# Patient Record
Sex: FEMALE | URBAN - METROPOLITAN AREA
[De-identification: names, ages, dates, MRNs, and addresses within clinical notes are randomized per-mention and may not be internally consistent; named-entity substitution may affect disease eponyms.]

---

## 2019-10-23 ENCOUNTER — APPOINTMENT (RX ONLY)
Dept: URBAN - METROPOLITAN AREA CLINIC 23 | Facility: CLINIC | Age: 39
Setting detail: DERMATOLOGY
End: 2019-10-23

## 2019-10-23 DIAGNOSIS — Z41.9 ENCOUNTER FOR PROCEDURE FOR PURPOSES OTHER THAN REMEDYING HEALTH STATE, UNSPECIFIED: ICD-10-CM

## 2019-10-23 PROCEDURE — ? FILLERS

## 2019-10-23 PROCEDURE — ? PULSED-DYE LASER

## 2019-10-23 PROCEDURE — ? RECOMMENDATIONS

## 2019-10-23 NOTE — PROCEDURE: FILLERS
Nasolabial Folds Filler Volume In Cc: 0
Lot #: [id number]
Additional Area 3 Location: marionette lines, oral commiseurs, NFs. lateral cheeks
Include Cannula Size?: 25G
Additional Anesthesia Volume In Cc: 6
Additional Area 2 Location: tear troughs (cannula used).  oral commissures , NFS
Additional Area 1 Location: fine lines , perioral, oral commissures
Include Cannula Length?: 1.5 inch
Additional Area 4 Location: vermillion lips, tear trough (cannula used)
Expiration Date (Month Year): 05/19/2020
Additional Area 1 Location: chin
Include Cannula Information In Note?: No
Additional Area 5 Location: upper lip
Additional Area 3 Location: lateral cheeks, mid cheek
Additional Area 4 Location: vermillion lips, perioral lines
Filler: Restylane
Detail Level: Zone
Lot #: [id number]
Expiration Date (Month Year): 10/31/21
Lot #: [id number]
Include Cannula Information In Note?: Yes
Price (Use Numbers Only, No Special Characters Or $): 0.00
Include Cannula Brand?: DermaSculpt
Expiration Date (Month Year): 12/31/21
Anesthesia Type: 1% lidocaine with epinephrine
Lot #: [id number]
Anesthesia Volume In Cc: 0.3
Additional Area 1 Location: right cheek
Expiration Date (Month Year): 08/31/2020
Map Statment: See [address] for Complete Details
Additional Area 1 Location: vermillion lips, left cheek smile line, left medial cheek
Post-Care Instructions: Patient instructed to apply ice to reduce swelling.
Additional Area 2 Location: Remaining syringe will be injected in 7 days post hydase injections to tear through areas.
Additional Area 1 Volume In Cc: 0.5
Consent: Written consent obtained. Risks include but not limited to bruising, beading, irregular texture, ulceration, infection, allergic reaction, scar formation, incomplete augmentation, temporary nature, procedural pain.

## 2019-10-23 NOTE — HPI: NODULE (SMALL BUMP UNDERNEATH SKIN)
How Severe Is Your Nodule?: mild
Is This A New Presentation, Or A Follow-Up?: Nodule
Additional History: Patient had filler done (odilia) in a different office. Patient states she thinks she has a bump.

## 2019-10-23 NOTE — PROCEDURE: RECOMMENDATIONS
Detail Level: Zone
Recommendations (Free Text): Bharati Amaya
Recommendation Preamble: The following recommendations were made during the visit:

## 2019-10-23 NOTE — PROCEDURE: PULSED-DYE LASER
Delay Time (Ms): [address] [facility]
Cryogen Time (Ms): [address] [address]
Spot Size: 7 mm
Post-Care Instructions: I reviewed with the patient in detail post-care instructions. Patient should stay away from the sun and wear sun protection until treated areas are fully healed.
Fluence In J/Cm2 (Optional): 6.50
Pulse Duration: 6 ms
Cryogen Time (Ms): [address]
Detail Level: Zone
Spot Size: 10 mm
Fluence In J/Cm2 (Optional): 5:50
Location Override: post cosmetic injections
Pulse Duration: 10 ms
Delay Time (Ms): 20
Cryogen Time (Ms): 27
Treated Area: small area
Consent: Written consent obtained, risks reviewed including but not limited to crusting, scabbing, blistering, scarring, darker or lighter pigmentary change, incidental hair removal, bruising, and/or incomplete removal.
Laser Type: Vbeam 595nm
Delay Time (Ms): 10
Delay Time (Ms): 0
Cryogen Time (Ms): 30

## 2020-01-21 ENCOUNTER — APPOINTMENT (RX ONLY)
Dept: URBAN - METROPOLITAN AREA CLINIC 23 | Facility: CLINIC | Age: 40
Setting detail: DERMATOLOGY
End: 2020-01-21

## 2020-01-21 DIAGNOSIS — Z41.9 ENCOUNTER FOR PROCEDURE FOR PURPOSES OTHER THAN REMEDYING HEALTH STATE, UNSPECIFIED: ICD-10-CM

## 2020-01-21 PROCEDURE — ? RECOMMENDATIONS

## 2020-01-21 PROCEDURE — ? SCLEROTHERAPY

## 2020-01-21 PROCEDURE — 36471 NJX SCLRSNT MLT INCMPTNT VN: CPT | Mod: 50

## 2020-01-21 ASSESSMENT — LOCATION SIMPLE DESCRIPTION DERM
LOCATION SIMPLE: RIGHT PRETIBIAL REGION
LOCATION SIMPLE: LEFT PRETIBIAL REGION

## 2020-01-21 ASSESSMENT — LOCATION DETAILED DESCRIPTION DERM
LOCATION DETAILED: RIGHT PROXIMAL PRETIBIAL REGION
LOCATION DETAILED: LEFT PROXIMAL PRETIBIAL REGION

## 2020-01-21 ASSESSMENT — LOCATION ZONE DERM: LOCATION ZONE: LEG

## 2020-01-21 NOTE — PROCEDURE: SCLEROTHERAPY
Sclerosant Volume (Cc): 10
Consent was obtained with risks, benefits, and alternatives discussed for the above procedures. Photographs were taken.
Number Of Injections (Will Not Render If 0): 0
Sclerosant Volume (Cc): 2.5
Render Post Care In Note: No
Detail Level: Zone
Disposition: Compression stockings and short stretch elastic bandages were placed postoperatively.
Post-Care Instructions: Compression for 3 weeks is critical to optimize your recovery and results. Compliance with compression helps to prevent blood clots and minimizes pain, swelling, bruising, skin discoloration (staining) and the recurrence of vessels. \nMaterials to gather for your wound care (all available over the counter)      \nCompression stockings x 2 pairs, 4 x 4 gauze, Comprilan wrap: 8 cm and 10 cm width wrap, Medical adhesive tape       \nCompression and Wound care;  Leg compression for 3 weeks is bird to your success and safety. Compression at night is only needed the first day. After that, compression is needed only during waking hours. However, if your leg feels better with compression at night, then you may continue compression at night as tolerated. \nAfter the sclerotherapy procedure, 2 layers compression will be placed. \n1. On the skin, folded or flat gauze will cover the treated areas. \n2. A compression wrap (Comprilan) will be wrapped around your leg over the gauze. Once the compression wrap is in place, a compression stocking will be worn. This two layer  compression (wrap plus stocking) should be worn for the first 24 hours if tolerated. \n3. After 24 hours, remove all compressions and dressings and just wear the compression stockings only during waking hours. \nYou will need to wear compression stockings for three weeks after your procedure, unless your physician instructs you otherwise. \nActivity       \n - It is important NOT to be sitting or lying down for several hours after surgery. You may begin walking immediately after surgery. This is good for you, but take it easy. \n - For 2 days after treatment: Avoid aerobic exercise, weight training, and all other types of exertion that increase your breathing and pulse rates. \n - Do not get a tan for one month after sclerotherapy. Tanning increases your risk of skin discoloration. \nBathing       \nAfter 24 hours, you may shower or bathe in tepid water, but keep the compression stocking on. Avoid immersion in hot tubs. \nDiscomfort . For pain or discomfort:       \n - You may take acetaminophen (TylenolTM, Extra-Strength TylenolTM or DatrilTM) as directed. \n - Do not use aspirin, products containing aspirin, or ibuprofen (for example AdvilTM or MotrinTM) for five days after your surgery, unless approved by your physician. \n - Dietary restrictions Do not drink alcoholic beverages for two days after your sclerotherapy procedure. \nPossible side effects following sclerotherapy  After sclerotherapy, mild swelling is expected. The injection sites may also become bruised or gray. You may also experience one or more of the following side effects, which almost always go away within one to four months:       \n - Darkening of the injected veins       \n - Brownish staining of the skin       \n - Small clotted vessels under the surface of the skin that you can feel      \n - Bruising of the injection sites       \nWhat to do about bruising  This will resolve within 2-3 weeks. If you wish the bruising to disappear sooner, then applying Arnicare cream (over the counter, health food stores) will help. \nWhat to do if you feel small clotted vessels under the surface of the skin. \n - Call us for a follow up appointment. These small clots can be drained through a small nick. \n - Draining these small clots will help you heal faster and with less discoloration.       \nWhen to contact your physician       \nContact your physician if you experience any of the following:       \n - Treated areas become increasingly sore, tender, red or warm       \n - Acetaminophen does not relieve your discomfort       \n - Injection sites turn black or the skin around the site breaks down       \n - Ulceration of the injection sites       \n - You develop blisters from the tape       \n - You develop significant swelling or pain in the leg       \n - Darkening of large areas of the skin or foot
Lot # A: [id number]
Expiration Date A (Month Year): 5/21

## 2020-01-21 NOTE — PROCEDURE: RECOMMENDATIONS
Recommendation Preamble: The following recommendations were made during the visit:
Recommendations (Free Text): Picoway laser for skin texture and tone\\nDiscussed thread lift vs fillers for the cheek area, Voluma\\nRecommended thread lift x 6 Melba 4
Detail Level: Zone

## 2020-02-04 ENCOUNTER — APPOINTMENT (RX ONLY)
Dept: URBAN - METROPOLITAN AREA CLINIC 23 | Facility: CLINIC | Age: 40
Setting detail: DERMATOLOGY
End: 2020-02-04

## 2020-02-04 DIAGNOSIS — Z41.9 ENCOUNTER FOR PROCEDURE FOR PURPOSES OTHER THAN REMEDYING HEALTH STATE, UNSPECIFIED: ICD-10-CM

## 2020-02-04 PROCEDURE — ? DIAGNOSIS COMMENT

## 2020-02-04 PROCEDURE — ? ADDITIONAL NOTES

## 2020-02-04 PROCEDURE — ? RECOMMENDATIONS

## 2020-02-04 PROCEDURE — ? CHEMICAL PEEL

## 2020-02-04 NOTE — HPI: COSMETIC FOLLOW UP
How Did You Tolerate The Procedure?: well, without problems
What Condition Are We Treating?: Patient here s/p 2 weeks post sclerotherapy lower legs. C/o few bruising legs and remaining spider veins.
What Procedure Did We Perform At The Last Visit?: Sclerotherapy bilateral lower legs.
- - -

## 2020-02-04 NOTE — PROCEDURE: ADDITIONAL NOTES
Additional Notes: Good result. Some few bruises observed at the sites of injections. Patient reassured to wait few more weeks and bruises will resolve. Second session of sclerotherapy recommended to treat remaining spider veins.
Detail Level: Detailed

## 2020-02-04 NOTE — PROCEDURE: CHEMICAL PEEL
Post-Care Instructions: I reviewed with the patient in detail post-care instructions. Patient should avoid sun exposure and wear sun protection.
Comments: Tretinoin 0.03% solution (2 coats) applied.
Number Of Layers: 1
Treatment Time (Optional): 2 minutes
Prep: The treated area was degreased with pre-peel cleanser, and vaseline was applied for protection of mucous membranes.
Price (Use Numbers Only, No Special Characters Or $): Obdulia 610
Lot Number (Optional): [id number]
Expiration Date (Optional): 02/2021
Post Peel Care: After the procedure, a post-peel cream was applied to the treated areas. Post-care instructions were reviewed with the patient.
Consent: Prior to the procedure, written consent was obtained and risks were reviewed, including but not limited to: redness, peeling, blistering, pigmentary change, scarring, infection, and pain.
Detail Level: Detailed
Chemical Peel: Skin Medica Vitalize

## 2020-02-18 ENCOUNTER — APPOINTMENT (RX ONLY)
Dept: URBAN - METROPOLITAN AREA CLINIC 23 | Facility: CLINIC | Age: 40
Setting detail: DERMATOLOGY
End: 2020-02-18

## 2020-02-18 DIAGNOSIS — Z41.9 ENCOUNTER FOR PROCEDURE FOR PURPOSES OTHER THAN REMEDYING HEALTH STATE, UNSPECIFIED: ICD-10-CM

## 2020-02-18 PROCEDURE — ? FILLERS

## 2020-02-18 NOTE — PROCEDURE: FILLERS
Expiration Date (Month Year): 03/24/21
Additional Area 1 Location: lateral cheeks, NLFâs
Include Cannula Size?: 25G
Consent: Written consent obtained. Risks include but not limited to bruising, beading, irregular texture, ulceration, infection, allergic reaction, scar formation, incomplete augmentation, temporary nature, procedural pain.
Additional Area 2 Volume In Cc: 0
Additional Area 2 Location: Remaining syringe will be injected in 7 days post hydase injections to tear through areas.
Include Cannula Length?: 1.5 inch
Map Statment: See [address] for Complete Details
Additional Area 1 Location: lateral jawline.
Post-Care Instructions: Patient instructed to apply ice to reduce swelling.
Filler: Juvederm Voluma XC
Additional Area 3 Location: vermillion lips, tear through, lateral cheeks
Include Cannula Information In Note?: No
Additional Area 2 Location: oral commissures ,marionettes lines , upper lip lines
Lot #: [id number]
Additional Area 4 Location: oral commissures, jawline, marionette lines
Include Cannula Brand?: DermaSculpt
Additional Area 3 Location: perioral fine lines, vermillion lips
Expiration Date (Month Year): 05/19/2020
Additional Area 4 Location: vermillion lips, marionettes lines
Additional Area 5 Location: lateral jawline ,lateral cheeks , temples
Additional Area 5 Location: jawlines, lateral cheeks
Anesthesia Type: 1% lidocaine with epinephrine
Detail Level: Detailed
Lot #: [id number]
Additional Area 1 Location: chin
Lateral Face Filler  Volume In Cc: 1
Additional Area 5 Location: oral commissures, upper lip lines, vermillion lips
Anesthesia Volume In Cc: 0.3
Lot #: [id number]
Price (Use Numbers Only, No Special Characters Or $): 0.00
Lot #: [id number]
Additional Anesthesia Volume In Cc: 6

## 2020-02-27 ENCOUNTER — APPOINTMENT (RX ONLY)
Dept: URBAN - METROPOLITAN AREA CLINIC 23 | Facility: CLINIC | Age: 40
Setting detail: DERMATOLOGY
End: 2020-02-27

## 2020-02-27 DIAGNOSIS — R23.3 SPONTANEOUS ECCHYMOSES: ICD-10-CM

## 2020-02-27 PROCEDURE — ? PULSED-DYE LASER

## 2020-02-27 PROCEDURE — ? ADDITIONAL NOTES

## 2020-02-27 NOTE — PROCEDURE: PULSED-DYE LASER
Consent: Written consent obtained, risks reviewed including but not limited to crusting, scabbing, blistering, scarring, darker or lighter pigmentary change, incidental hair removal, bruising, and/or incomplete removal.
Delay Time (Ms): 10
Cryogen Time (Ms): [address] [address]
Pulse Duration: 10 ms
Delay Time (Ms): 21
Cryogen Time (Ms): 30
Fluence In J/Cm2 (Optional): 12.00
Detail Level: Detailed
Treated Area: medium area
Spot Size: 10 mm
Spot Size: 10x3 mm
Cryogen Time (Ms): 27
Immediate Endpoint: purpura
Post-Care Instructions: I reviewed with the patient in detail post-care instructions. Patient should stay away from the sun and wear sun protection until treated areas are fully healed.
Delay Time (Ms): [address] [facility]
Fluence In J/Cm2 (Optional): 6.50
Treated Area: small area
Spot Size: 7 mm
Delay Time (Ms): 20
Laser Type: Vbeam 595nm

## 2020-12-23 ENCOUNTER — APPOINTMENT (RX ONLY)
Dept: URBAN - METROPOLITAN AREA CLINIC 23 | Facility: CLINIC | Age: 40
Setting detail: DERMATOLOGY
End: 2020-12-23

## 2020-12-23 VITALS — TEMPERATURE: 97.6 F

## 2020-12-23 DIAGNOSIS — Z41.9 ENCOUNTER FOR PROCEDURE FOR PURPOSES OTHER THAN REMEDYING HEALTH STATE, UNSPECIFIED: ICD-10-CM

## 2020-12-23 PROCEDURE — ? FILLERS

## 2020-12-23 PROCEDURE — ? DYSPORT

## 2020-12-23 NOTE — PROCEDURE: DYSPORT
Post-Care Instructions: Patient instructed to not lie down for 4 hours, limit physical activity for 24 hours and avoid manipulation of the treated areas.
Show Lateral Platysmal Band Units: Yes
Levator Labii Superioris Units: 0
Glabellar Complex Units: 15
Additional Area 4 Location: left lateral brows
Show Lcl Units: No
Forehead Units: [address] [address]
Dilution (U/ 0.1cc): 1.5
Additional Area 2 Location: lateral brows
Additional Area 5 Location: bunnies lines
Consent: Written consent obtained. Risks include but not limited to lid/brow ptosis, bruising, swelling, diplopia, temporary effect, incomplete chemical denervation.
Periorbital Skin Units: 30
Expiration Date (Month Year): 08/31/21
Lot #: [id number]
Detail Level: Detailed
Additional Area 6 Location: chin
Additional Area 3 Location: high forehead 2 cm above brows
Additional Area 1 Location: high forehead (2.1 cm above brow)

## 2020-12-23 NOTE — PROCEDURE: FILLERS
Additional Area 5 Volume In Cc: 1
Post-Care Instructions: Patient instructed to apply ice to reduce swelling.
Nasolabial Folds Filler Volume In Cc: 0
Lot #: [id number]
Include Cannula Size?: 25G
Additional Area 5 Location: lateral cheeks, medial cheeks NLFs
Consent: Written consent obtained. Risks include but not limited to bruising, beading, irregular texture, ulceration, infection, allergic reaction, scar formation, incomplete augmentation, temporary nature, procedural pain.
Additional Area 1 Location: lateral jawline, lateral cheeks, jawline, temples.
Include Cannula Information In Note?: No
Expiration Date (Month Year): 11/2022
Include Cannula Length?: 1.5 inch
Additional Area 4 Location: lips
Lot #: [id number]
Price (Use Numbers Only, No Special Characters Or $): 0.00
Additional Area 3 Location: glabella lines, NLFs, oral commissures, media cheeks
Filler: Rona Fisher
Expiration Date (Month Year): 2023-01-31
Additional Area 5 Location: oral commissures, upper lip lines, vermillion lips
Additional Area 2 Location: lateral cheeks,oral commesure,chin and upper lip lines
Detail Level: Detailed
Lot #: [id number]
Additional Area 1 Location: lateral cheeks, marionette lines, medial cheeks, lateral jawline
Additional Area 5 Location: cheeks (cannula used
Include Cannula Brand?: DermaSculpt
Additional Anesthesia Volume In Cc: 6
Additional Area 1 Location: chin
Additional Area 4 Location: era lobes, jawline
Expiration Date (Month Year): 07/31/23
Additional Area 3 Location: perioral fine lines, vermillion lips, NLFs. marionette lines upper lip lines
Anesthesia Volume In Cc: 0.3
Additional Area 1 Location: lateral mouth, marionette lines, lateral cheeks.
Expiration Date (Month Year): 05/19/2020
Lot #: [id number]
Additional Area 2 Location: oral commissures ,marionettes lines ,perioral fine lines, NLFâs.
Anesthesia Type: 1% lidocaine with epinephrine
Map Statment: See [address] for Complete Details

## 2021-01-07 ENCOUNTER — APPOINTMENT (RX ONLY)
Dept: URBAN - METROPOLITAN AREA CLINIC 23 | Facility: CLINIC | Age: 41
Setting detail: DERMATOLOGY
End: 2021-01-07

## 2021-01-07 VITALS — TEMPERATURE: 97.7 F

## 2021-01-07 DIAGNOSIS — Z41.9 ENCOUNTER FOR PROCEDURE FOR PURPOSES OTHER THAN REMEDYING HEALTH STATE, UNSPECIFIED: ICD-10-CM

## 2021-01-07 PROCEDURE — ? HYALURONIDASE INJECTION

## 2021-01-07 PROCEDURE — ? DYSPORT

## 2021-01-07 ASSESSMENT — LOCATION ZONE DERM: LOCATION ZONE: FACE

## 2021-01-07 ASSESSMENT — LOCATION DETAILED DESCRIPTION DERM: LOCATION DETAILED: RIGHT MEDIAL MALAR CHEEK

## 2021-01-07 ASSESSMENT — LOCATION SIMPLE DESCRIPTION DERM: LOCATION SIMPLE: RIGHT CHEEK

## 2021-01-07 NOTE — PROCEDURE: DYSPORT
Show Mentalis Units: No
L Brow Units: 0
Additional Area 1 Location: high forehead (touch up)
Additional Area 4 Location: left lateral brows
Show Levator Superior Units: Yes
Additional Area 1 Units: 2
Lot #: [id number]
Topical Anesthesia?: 20% benzocaine, 8% lidocaine, 4% tetracaine
Additional Area 2 Location: Marleni Pink
Consent: Written consent obtained. Risks include but not limited to lid/brow ptosis, bruising, swelling, diplopia, temporary effect, incomplete chemical denervation.
Dilution (U/ 0.1cc): 1.5
Additional Area 5 Location: bunnies lines
Length Of Topical Anesthesia Application (Optional): 15 minutes
Additional Area 3 Location: glabella
Post-Care Instructions: Patient instructed to not lie down for 4 hours, limit physical activity for 24 hours and avoid manipulation of the treated areas.
Expiration Date (Month Year): 05/31/21
Additional Area 6 Location: chin
Detail Level: Detailed

## 2021-01-07 NOTE — PROCEDURE: HYALURONIDASE INJECTION
Treatment Number (Optional): 1
Expiration Date (Optional): 09/2021
Total Volume (Ccs): 0.2
Hyaluronidase Preparation: hyaluronidase 150 USP units/ml
Administered By (Optional): Dr. Janelle Leventhal
Lot # (Optional): [id number]
Filler Previously Used (Optional): filler
Detail Level: Detailed
Consent: The risks of contour defects and dimpling of the skin were reviewed with the patient prior to the injection.

## 2021-01-14 ENCOUNTER — APPOINTMENT (RX ONLY)
Dept: URBAN - METROPOLITAN AREA CLINIC 23 | Facility: CLINIC | Age: 41
Setting detail: DERMATOLOGY
End: 2021-01-14

## 2021-01-14 VITALS — TEMPERATURE: 97.3 F

## 2021-01-14 DIAGNOSIS — Z41.9 ENCOUNTER FOR PROCEDURE FOR PURPOSES OTHER THAN REMEDYING HEALTH STATE, UNSPECIFIED: ICD-10-CM

## 2021-01-14 PROCEDURE — ? FILLERS

## 2021-01-14 NOTE — PROCEDURE: FILLERS
Additional Area 4 Volume In Cc: 0
Include Cannula Information In Note?: No
Map Statment: See [address] for Complete Details
Expiration Date (Month Year): 11/2022
Additional Area 1 Location: using the cannula to right forehead ( superior brows fine lines).
Additional Area 5 Location: cheeks (cannula used
Additional Anesthesia Volume In Cc: 6
Lot #: [id number]
Include Cannula Brand?: DermaSculpt
Additional Area 4 Location: era lobes, jawline
Expiration Date (Month Year): 05/19/2020
Include Cannula Size?: 25G
Anesthesia Volume In Cc: 0.3
Price (Use Numbers Only, No Special Characters Or $): 0.00
Expiration Date (Month Year): 2021-12-24
Lot #: [id number]
Include Cannula Length?: 1.5 inch
Anesthesia Type: 1% lidocaine with epinephrine
Detail Level: Detailed
Additional Area 1 Location: chin
Additional Area 3 Location: perioral fine lines, vermillion lips, NLFs. marionette lines upper lip lines
Additional Area 5 Location: lips,upper lip lines
Additional Area 1 Location: lateral mouth, marionette lines, lateral cheeks.
Additional Area 2 Location: oral commissures ,marionettes lines ,perioral fine lines, NLFâs.
Additional Area 4 Location: lips
Cheeks Filler Volume In Cc: 1
Additional Area 5 Location: oral commissures, upper lip lines, vermillion lips
Additional Area 3 Location: marionette and oral commissures, vermillion border
Post-Care Instructions: Patient instructed to apply ice to reduce swelling.
Expiration Date (Month Year): 2023-01-31
Filler: Voluma
Consent: Written consent obtained. Risks include but not limited to bruising, beading, irregular texture, ulceration, infection, allergic reaction, scar formation, incomplete augmentation, temporary nature, procedural pain.
Lot #: [id number]
Additional Area 2 Location: lateral cheeks,oral commesure,chin and upper lip lines
Lot #: [id number]

## 2021-04-06 ENCOUNTER — APPOINTMENT (RX ONLY)
Dept: URBAN - METROPOLITAN AREA CLINIC 23 | Facility: CLINIC | Age: 41
Setting detail: DERMATOLOGY
End: 2021-04-06

## 2021-04-06 VITALS — TEMPERATURE: 97.7 F

## 2021-04-06 DIAGNOSIS — Z41.9 ENCOUNTER FOR PROCEDURE FOR PURPOSES OTHER THAN REMEDYING HEALTH STATE, UNSPECIFIED: ICD-10-CM

## 2021-04-06 PROCEDURE — ? PRESCRIPTION

## 2021-04-06 PROCEDURE — ? PICOWAY LASER

## 2021-04-06 RX ORDER — TRETINOIN 0.5 MG/G
LOTION TOPICAL
Qty: 1 | Refills: 10 | Status: ERX | COMMUNITY
Start: 2021-04-06

## 2021-04-06 RX ADMIN — TRETINOIN 1: 0.5 LOTION TOPICAL at 00:00

## 2021-04-06 NOTE — PROCEDURE: PICOWAY LASER
Spot Size: 6.0 mm
Post-Procedure Care: Immediate endpoint: perifollicular erythema and edema. Vaseline and ice applied. Post care reviewed with patient.
Total Pulses: 5 HZ
External Cooling Fan Speed: 0
Post-Care Instructions: I reviewed with the patient in detail post-care instructions. Patient should avoid sun for a minimum of 4 weeks before and after treatment.
Wavelength: 1064 nm
Total Pulses: 4Hz
Hide Pulse Duration?: No
Treatment Number: 1
Handpiece: Resolve
Fluence (J/Cm2): 0.4
Frequency (Hz): Cloteal Cluck
Spot Size: 6.5 mm
Topical Anesthesia Type: 20% benzocaine, 8% lidocaine, 4% tetracaine
Fluence (J/Cm2): 0.5
Wavelength: 532 nm
Anesthesia Type: 1% lidocaine with epinephrine
Location Override: face
Pre-Procedure: Prior to proceeding the treatment areas were cleaned and all present put on their eye protection.
Total Pulses: 4 HZ
Fluence (J/Cm2): 1.9
Consent: Written consent obtained, risks reviewed including but not limited to crusting, scabbing, blistering, scarring, darker or lighter pigmentary change, paradoxical hair regrowth, incomplete removal of hair and infection.
Fluence (J/Cm2): 1.7
Detail Level: Detailed

## 2021-06-02 ENCOUNTER — APPOINTMENT (RX ONLY)
Dept: URBAN - METROPOLITAN AREA CLINIC 23 | Facility: CLINIC | Age: 41
Setting detail: DERMATOLOGY
End: 2021-06-02

## 2021-06-02 VITALS — TEMPERATURE: 97.9 F

## 2021-06-02 DIAGNOSIS — Z41.9 ENCOUNTER FOR PROCEDURE FOR PURPOSES OTHER THAN REMEDYING HEALTH STATE, UNSPECIFIED: ICD-10-CM

## 2021-06-02 PROCEDURE — ? DYSPORT

## 2021-06-02 PROCEDURE — ? FILLERS

## 2021-06-02 NOTE — PROCEDURE: DYSPORT
Show Depressor Anguli Units: Yes
Additional Area 3 Location: lateral eyes
Periorbital Skin Units: 10
Mentalis Units: 0
Consent: Written consent obtained. Risks include but not limited to lid/brow ptosis, bruising, swelling, diplopia, temporary effect, incomplete chemical denervation.
Dilution (U/ 0.1cc): 1.5
Additional Area 6 Location: bunnies lines
Show Right And Left Brow Units: No
Additional Area 2 Location: glabella
Forehead Units: [address]
Post-Care Instructions: Patient instructed to not lie down for 4 hours, limit physical activity for 24 hours and avoid manipulation of the treated areas.
Additional Area 5 Location: neck bands
Expiration Date (Month Year): 2/28/22
Lot #: [id number]
Detail Level: Detailed
Additional Area 4 Location: chin
Glabellar Complex Units: [address] [address]
Additional Area 1 Location: axillae

## 2021-06-02 NOTE — PROCEDURE: FILLERS
Marionette Lines Filler  Volume In Cc: 0
Lot #: [id number]
Anesthesia Type: 1% lidocaine with epinephrine
Price (Use Numbers Only, No Special Characters Or $): 0.00
Additional Area 1 Location: chin lines, marionette lines (physician sample)
Include Cannula Information In Note?: No
Anesthesia Volume In Cc: 0.3
Include Cannula Brand?: DermaSculpt
Additional Area 5 Location: oral commissures, upper lip lines, vermillion lips
Expiration Date (Month Year): 2023-10
Additional Area 1 Location: lat cheeks
Lot #: [id number]
Additional Area 1 Volume In Cc: 0.5
Additional Area 2 Location: tear troughs (cannula used). Using conventional needle to cheeks.
Additional Anesthesia Volume In Cc: 6
Include Cannula Length?: 1.5 inch
Expiration Date (Month Year): 2023-11
Additional Area 3 Location: lateral cheeks, oral commesure and lip line
Filler: Restylane-L
Include Cannula Information In Note?: Yes
Additional Area 1 Location: NLf and darshana
Map Statment: See [address] for Complete Details
Additional Area 4 Location: lips, upper lip lines marionette lines, oral commissures
Additional Area 2 Location: chin and cheeks using an acanula
Additional Area 1 Location: chin
Include Cannula Size?: 25G
Additional Area 3 Location: perioral fine lines, vermillion lips, NLFs. marionette lines upper lip lines
Additional Area 5 Location: lateral mouth,oral commesure, marionette lines, upper lip lines
Lot #: [id number]
Additional Area 4 Location: chin, marionette lines
Lot #: [id number]
Consent: Written consent obtained. Risks include but not limited to bruising, beading, irregular texture, ulceration, infection, allergic reaction, scar formation, incomplete augmentation, temporary nature, procedural pain.
Expiration Date (Month Year): 07/20/21
Expiration Date (Month Year): 2023-10-31
Additional Area 5 Location: cheeks (cannula used) tear throughs
Post-Care Instructions: Patient instructed to apply ice to reduce swelling.
Detail Level: Detailed

## 2021-06-22 ENCOUNTER — NON-APPOINTMENT (OUTPATIENT)
Age: 41
End: 2021-06-22

## 2021-06-22 ENCOUNTER — APPOINTMENT (OUTPATIENT)
Dept: OPHTHALMOLOGY | Facility: CLINIC | Age: 41
End: 2021-06-22
Payer: COMMERCIAL

## 2021-06-22 PROCEDURE — 92014 COMPRE OPH EXAM EST PT 1/>: CPT

## 2021-06-22 PROCEDURE — 92020 GONIOSCOPY: CPT

## 2021-07-09 ENCOUNTER — OUTPATIENT (OUTPATIENT)
Dept: OUTPATIENT SERVICES | Facility: HOSPITAL | Age: 41
LOS: 1 days | End: 2021-07-09

## 2021-08-26 ENCOUNTER — APPOINTMENT (RX ONLY)
Dept: URBAN - METROPOLITAN AREA CLINIC 23 | Facility: CLINIC | Age: 41
Setting detail: DERMATOLOGY
End: 2021-08-26

## 2021-08-26 DIAGNOSIS — Z41.9 ENCOUNTER FOR PROCEDURE FOR PURPOSES OTHER THAN REMEDYING HEALTH STATE, UNSPECIFIED: ICD-10-CM

## 2021-08-26 PROCEDURE — ? BOTOX

## 2021-08-26 PROCEDURE — ? CHEMICAL PEEL

## 2021-08-26 ASSESSMENT — LOCATION DETAILED DESCRIPTION DERM: LOCATION DETAILED: LEFT INFERIOR CENTRAL MALAR CHEEK

## 2021-08-26 ASSESSMENT — LOCATION ZONE DERM: LOCATION ZONE: FACE

## 2021-08-26 ASSESSMENT — LOCATION SIMPLE DESCRIPTION DERM: LOCATION SIMPLE: LEFT CHEEK

## 2021-08-26 NOTE — PROCEDURE: CHEMICAL PEEL
Post-Care Instructions: I reviewed with the patient in detail post-care instructions. Patient should avoid sun exposure and wear sun protection.
Number Of Layers: 4
Lot Number (Optional): [id number]
Treatment Number: 1
Prep: The treated area was degreased with pre-peel cleanser, and vaseline was applied for protection of mucous membranes.
Post Peel Care: After the procedure, a post-peel cream was applied to the treated areas. Post-care instructions were reviewed with the patient.
Detail Level: Zone
Consent: Prior to the procedure, written consent was obtained and risks were reviewed, including but not limited to: redness, peeling, blistering, pigmentary change, scarring, infection, and pain.
Treatment Time (Optional): 3 minutes
Expiration Date (Optional): 03/2023
Chemical Peel: Skin Medica Vitalize

## 2021-08-26 NOTE — PROCEDURE: BOTOX
Masseter Units: 0
Show Levator Superior Units: Yes
Show Right And Left Pupillary Line Units: No
Detail Level: Detailed
Additional Area 4 Location: lateral brows
Additional Area 5 Location: lip flip
Lot #: [id number]
Additional Area 6 Location: axilla
Additional Area 1 Location: lat brows
Expiration Date (Month Year): 2023-11
Forehead Units: 8
Post-Care Instructions: Patient instructed to not lie down for 4 hours and limit physical activity for 24 hours. Patient instructed not to travel by airplane for 48 hours.
Dilution (U/0.1 Cc): 2.5
Consent: Written consent obtained. Risks include but not limited to lid/brow ptosis, bruising, swelling, diplopia, temporary effect, incomplete chemical denervation.
Additional Area 3 Location: lateral eyes
Additional Area 2 Location: glabella

## 2021-11-02 ENCOUNTER — APPOINTMENT (RX ONLY)
Dept: URBAN - METROPOLITAN AREA CLINIC 23 | Facility: CLINIC | Age: 41
Setting detail: DERMATOLOGY
End: 2021-11-02

## 2021-11-02 DIAGNOSIS — Z41.9 ENCOUNTER FOR PROCEDURE FOR PURPOSES OTHER THAN REMEDYING HEALTH STATE, UNSPECIFIED: ICD-10-CM

## 2021-11-02 PROCEDURE — ? FILLERS

## 2021-11-02 PROCEDURE — ? DYSPORT

## 2021-11-02 NOTE — PROCEDURE: FILLERS
Reason For Visit  DALTNO GUERRA is here today for a nurse visit for immunizations.   Patient accompanied by mother .      Current Meds  No Reported Medications Recorded    Discussion/Summary    Here today for Hep A and flu shot. Instructed mom to make appt for 5 yr [facility] and will receive immunizations at that time. Updated immunizations in chart.           Assessment   1. Well child visit (Z00.129)    Plan  Immunizations    1. Flulaval Quadrivalent 0.5 ML Intramuscular Suspension Prefilled Syringe   2. hepatitis A vaccine, pediatric/adolescent dosage, 2 dose schedule    Signatures   Electronically signed by : Vi Limon R.N.; Nov 7 2018  5:48PM CST    
Cheeks Filler Volume In Cc: 0
Expiration Date (Month Year): 2023-05
Expiration Date (Month Year): 2023-02-28
Include Cannula Information In Note?: No
Map Statment: See [address] for Complete Details
Anesthesia Type: 1% lidocaine with epinephrine
Lot #: [id number]
Detail Level: Detailed
Additional Area 2 Location: chin,glabella fine lines
Additional Area 1 Location: lateral mouth, jawline, marionette lines, chin area.
Lot #: [id number]
Include Cannula Brand?: DermaSculpt
Consent: Written consent obtained. Risks include but not limited to bruising, beading, irregular texture, ulceration, infection, allergic reaction, scar formation, incomplete augmentation, temporary nature, procedural pain.
Expiration Date (Month Year): 2023-01-31
Include Cannula Length?: 1.5 inch
Post-Care Instructions: Patient instructed to apply ice to reduce swelling.
Additional Area 2 Location: marionette lines, upper lip lines, vermillion lips
Expiration Date (Month Year): 07/20/21
Include Cannula Size?: 25G
Additional Area 3 Location: perioral fine lines, vermillion lips, NLFs. marionette lines upper lip lines
Price (Use Numbers Only, No Special Characters Or $): Mario 354
Additional Area 1 Location: chin lines, marionette lines (physician sample)
Additional Area 1 Location: lateral cheeks, cheeks.
Additional Area 4 Location: chin, lateral cheeks, NLFâs, marionette lines
Additional Area 1 Volume In Cc: 1
Additional Area 2 Location: lateral cheeks,oral commissures and marionette
Additional Area 5 Location: cheeks (cannula used) tear throughs
Additional Area 5 Location: oral commissures, upper lip lines, vermillion lips
Filler: Restylane Contour
Lot #: [id number]
Lot #: [id number] [facility]
Additional Area 1 Location: marionette lines, oral commissures,

## 2021-11-02 NOTE — PROCEDURE: DYSPORT
Anterior Platysmal Bands Units: 0
Show Mentalis Units: No
Detail Level: Zone
Show Glabellar Units: Yes
Additional Area 2 Location: glabella
Additional Area 3 Units: 10
Length Of Topical Anesthesia Application (Optional): 15 minutes
Consent: Written consent obtained. Risks include but not limited to lid/brow ptosis, bruising, swelling, diplopia, temporary effect, incomplete chemical denervation.
Additional Area 4 Location: lateral eyes
Additional Area 1 Location: high forehead 2 cm above brows
Expiration Date (Month Year): 2022-06-30
Lot #: [id number]
Additional Area 2 Units: [address] [facility]
Topical Anesthesia?: 20% benzocaine, 8% lidocaine, 4% tetracaine
Dilution (U/ 0.1cc): 1.5
Post-Care Instructions: Patient instructed to not lie down for 4 hours, limit physical activity for 24 hours and avoid manipulation of the treated areas.
Additional Area 1 Units: [address]

## 2021-11-11 ENCOUNTER — APPOINTMENT (RX ONLY)
Dept: URBAN - METROPOLITAN AREA CLINIC 23 | Facility: CLINIC | Age: 41
Setting detail: DERMATOLOGY
End: 2021-11-11

## 2021-11-11 ENCOUNTER — RX ONLY (OUTPATIENT)
Age: 41
Setting detail: RX ONLY
End: 2021-11-11

## 2021-11-11 DIAGNOSIS — Z41.9 ENCOUNTER FOR PROCEDURE FOR PURPOSES OTHER THAN REMEDYING HEALTH STATE, UNSPECIFIED: ICD-10-CM

## 2021-11-11 PROCEDURE — ? BOTOX

## 2021-11-11 RX ORDER — TRETINOIN 0.5 MG/G
LOTION TOPICAL
Qty: 20 | Refills: 3 | Status: ERX | COMMUNITY
Start: 2021-11-11

## 2021-11-11 NOTE — PROCEDURE: BOTOX
Additional Area 5 Units: 0
Show Depressor Anguli Units: Yes
Lot #: [id number]
Show Right And Left Periorbital Units: No
Additional Area 4 Location: neck bands
Additional Area 3 Location: bunnies lines
Dilution (U/0.1 Cc): 1.2
Consent: Written consent obtained. Risks include but not limited to lid/brow ptosis, bruising, swelling, diplopia, temporary effect, incomplete chemical denervation.
Additional Area 6 Location: axilla
Additional Area 1 Location: lateral brow
Additional Area 2 Units: 8
Detail Level: Detailed
Expiration Date (Month Year): 2024-04
Additional Area 2 Location: high forehead 2 cm above brows ( touch up).
Post-Care Instructions: Patient instructed to not lie down for 4 hours and limit physical activity for 24 hours. Patient instructed not to travel by airplane for 48 hours.
Additional Area 5 Location: chin

## 2021-11-19 ENCOUNTER — APPOINTMENT (RX ONLY)
Dept: URBAN - METROPOLITAN AREA CLINIC 23 | Facility: CLINIC | Age: 41
Setting detail: DERMATOLOGY
End: 2021-11-19

## 2021-11-19 DIAGNOSIS — Z41.9 ENCOUNTER FOR PROCEDURE FOR PURPOSES OTHER THAN REMEDYING HEALTH STATE, UNSPECIFIED: ICD-10-CM

## 2021-11-19 PROCEDURE — ? ADDITIONAL NOTES

## 2021-11-19 PROCEDURE — ? DYSPORT

## 2021-11-19 NOTE — PROCEDURE: ADDITIONAL NOTES
Render Risk Assessment In Note?: no
Detail Level: Detailed
Additional Notes: Hyfrector left temple red lesion

## 2021-11-19 NOTE — PROCEDURE: DYSPORT
Right Pupillary Line Units: 0
Show Additional Area 2: Yes
Detail Level: Zone
Consent: Written consent obtained. Risks include but not limited to lid/brow ptosis, bruising, swelling, diplopia, temporary effect, incomplete chemical denervation.
Show Ucl Units: No
Additional Area 5 Units: [address] [facility]
Expiration Date (Month Year): 08/31/22
Lot #: [id number]
Dilution (U/ 0.1cc): 1.5
Additional Area 3 Location: high forehead 2 cm above brows
Additional Area 5 Location: left masseter
Additional Area 2 Location: lateral eyes
Post-Care Instructions: Patient instructed to not lie down for 4 hours, limit physical activity for 24 hours and avoid manipulation of the treated areas.
Additional Area 4 Location: glabella

## 2021-12-14 ENCOUNTER — APPOINTMENT (RX ONLY)
Dept: URBAN - METROPOLITAN AREA CLINIC 23 | Facility: CLINIC | Age: 41
Setting detail: DERMATOLOGY
End: 2021-12-14

## 2021-12-14 DIAGNOSIS — Z41.9 ENCOUNTER FOR PROCEDURE FOR PURPOSES OTHER THAN REMEDYING HEALTH STATE, UNSPECIFIED: ICD-10-CM

## 2021-12-14 PROCEDURE — ? HYDRAFACIAL

## 2021-12-14 ASSESSMENT — LOCATION SIMPLE DESCRIPTION DERM: LOCATION SIMPLE: LEFT CHEEK

## 2021-12-14 ASSESSMENT — LOCATION ZONE DERM: LOCATION ZONE: FACE

## 2021-12-14 ASSESSMENT — LOCATION DETAILED DESCRIPTION DERM: LOCATION DETAILED: LEFT INFERIOR MEDIAL MALAR CHEEK

## 2021-12-14 NOTE — PROCEDURE: HYDRAFACIAL
Procedure: Peel
Vacuum Pressure Low Setting (Will Not Render If Set To 0): 0
Solution: Antiox-6
Treatment Number: 1
Tip Override
Indication: skin texture
Solution Override: rozatrol
Solution: GlySal 7.5%
Consent: Written consent obtained, risks reviewed including but not limited to crusting, scabbing, blistering, scarring, darker or lighter pigmentary change, bruising, and/or incomplete response.
Tip: Hydropeel Tip, Blue
Procedure: Exfoliation
Post-Care Instructions: I reviewed with the patient in detail post-care instructions. Patient should stay away from the sun and wear sun protection until treated areas are fully healed.
Solution Override
Location: face
Tip: Hydropeel Tip, Clear
Tip: Hydropeel Tip, Teal
Procedure: Extraction
Procedure: Fusion
Solution: Activ-4
Solution: Beta-HD
Solution Override: perk eye
endoscopy
holding area

## 2022-04-05 ENCOUNTER — APPOINTMENT (RX ONLY)
Dept: URBAN - METROPOLITAN AREA CLINIC 23 | Facility: CLINIC | Age: 42
Setting detail: DERMATOLOGY
End: 2022-04-05

## 2022-04-05 DIAGNOSIS — Z41.9 ENCOUNTER FOR PROCEDURE FOR PURPOSES OTHER THAN REMEDYING HEALTH STATE, UNSPECIFIED: ICD-10-CM

## 2022-04-05 PROCEDURE — ? BOTOX

## 2022-04-05 PROCEDURE — ? FILLERS

## 2022-04-05 PROCEDURE — ? PULSED-DYE LASER

## 2022-04-05 NOTE — PROCEDURE: FILLERS
Additional Area 3 Volume In Cc: 1
Cheeks Filler Volume In Cc: 0
Anesthesia Type: 1% lidocaine without epinephrine
Lot #: [id number]
Additional Area 1 Location: lateral mouth, marionette lines,vermilion lips
Expiration Date (Month Year): 2024-07-31
Additional Area 3 Location: left and right  temple
Include Cannula Length?: 1.5 inch
Filler: Rona Fisher
Include Cannula Information In Note?: No
Additional Area 2 Location: using the cannula  for cheeks
Include Cannula Size?: 25G
Include Cannula Brand?: DermaSculpt
Expiration Date (Month Year): 2023-01-23
Additional Area 1 Location: lips, chin
Lot #: [id number]
Include Cannula Information In Note?: Yes
Include Cannula Length?: 1 inch
Map Statment: See [address] for Complete Details
Additional Area 5 Location: [facility], marionette lines
Expiration Date (Month Year): 2024-08-31
Post-Care Instructions: Patient instructed to apply ice to reduce swelling.
Additional Area 2 Location: left dorsal hand
Additional Area 4 Location: chin, lateral cheeks, NLF?s, marionette lines
Consent: Written consent obtained. Risks include but not limited to bruising, beading, irregular texture, ulceration, infection, allergic reaction, scar formation, incomplete augmentation, temporary nature, procedural pain.
Additional Area 1 Location: chin lines, marionette lines (physician sample)
Lot #: [id number]
Additional Area 5 Location: oral commissures, upper lip lines, vermillion lips
Additional Area 1 Location: vermillion lips, perioral fine lines
Additional Area 3 Location: marionette lines, lateral mouth,oral commesure
Additional Area 5 Location: darshana zarate cheeks
Lot #: [id number]
Additional Area 2 Location: chin
Anesthesia Volume In Cc: 0.2
Detail Level: Zone
Additional Area 4 Location: cheeks, nfls , marionette lines,oral commissures
Poor

## 2022-04-05 NOTE — PROCEDURE: BOTOX
Additional Area 4 Units: 6
Depressor Anguli Oris Units: 0
Show Forehead Units: Yes
Additional Area 6 Units: 4
Additional Area 4 Location: Right massetter
Show Mentalis Units: No
Additional Area 6 Location: [facility]
Additional Area 1 Location: neckbands
Expiration Date (Month Year): 2024/05
Detail Level: Detailed
Additional Area 3 Location: upper lip
Post-Care Instructions: Patient instructed to not lie down for 4 hours and limit physical activity for 24 hours. Patient instructed not to travel by airplane for 48 hours.
Dilution (U/0.1 Cc): 2.5
Show Inventory Tab: Hide
Additional Area 5 Location: neck bands
Forehead Units: 4 [facility]
Lot #: [id number]
Consent: Written consent obtained. Risks include but not limited to lid/brow ptosis, bruising, swelling, diplopia, temporary effect, incomplete chemical denervation.
Additional Area 2 Location: lateral brows

## 2022-04-05 NOTE — PROCEDURE: PULSED-DYE LASER
Treated Area: small area
Treated Area: large area
Cryogen Time (Ms): [id number] [address]
Post-Care Instructions: I reviewed with the patient in detail post-care instructions. Patient should stay away from the sun and wear sun protection until treated areas are fully healed.
Spot Size Override: 3x10
Cryogen Time (Ms): 30
Delay Time (Ms): [address]
Pulse Duration: 6 ms
Pulse Duration: 10 ms
Laser Type: Vbeam 595nm
Consent: Written consent obtained, risks reviewed including but not limited to crusting, scabbing, blistering, scarring, darker or lighter pigmentary change, incidental hair removal, bruising, and/or incomplete removal.
Fluence In J/Cm2 (Optional): 6.25
Immediate Endpoint: erythema
Spot Size: 10 mm
Fluence In J/Cm2 (Optional): 6.50
Delay Time (Ms): [facility]
Delay Time (Ms): 10
Delay Time (Ms): 20
Detail Level: Detailed

## 2022-06-22 ENCOUNTER — APPOINTMENT (OUTPATIENT)
Dept: OPHTHALMOLOGY | Facility: CLINIC | Age: 42
End: 2022-06-22
Payer: COMMERCIAL

## 2022-06-22 ENCOUNTER — NON-APPOINTMENT (OUTPATIENT)
Age: 42
End: 2022-06-22

## 2022-06-22 PROCEDURE — 92133 CPTRZD OPH DX IMG PST SGM ON: CPT

## 2022-06-22 PROCEDURE — 92014 COMPRE OPH EXAM EST PT 1/>: CPT

## 2022-06-22 PROCEDURE — 92020 GONIOSCOPY: CPT

## 2022-07-28 ENCOUNTER — NON-APPOINTMENT (OUTPATIENT)
Age: 42
End: 2022-07-28

## 2022-07-28 ENCOUNTER — APPOINTMENT (OUTPATIENT)
Dept: OPHTHALMOLOGY | Facility: CLINIC | Age: 42
End: 2022-07-28

## 2022-07-28 PROCEDURE — 92012 INTRM OPH EXAM EST PATIENT: CPT

## 2022-09-29 ENCOUNTER — APPOINTMENT (RX ONLY)
Dept: URBAN - METROPOLITAN AREA CLINIC 23 | Facility: CLINIC | Age: 42
Setting detail: DERMATOLOGY
End: 2022-09-29

## 2022-09-29 DIAGNOSIS — Z41.9 ENCOUNTER FOR PROCEDURE FOR PURPOSES OTHER THAN REMEDYING HEALTH STATE, UNSPECIFIED: ICD-10-CM

## 2022-09-29 PROCEDURE — ? BOTOX

## 2022-09-29 PROCEDURE — ? FILLERS

## 2022-09-29 NOTE — PROCEDURE: FILLERS
Brows Filler  Volume In Cc: 0
Inventory Information: This plan will send filler information to inventory based on the fillers you select. Multiple fillers can be sent but you must ensure you select the appropriate fillers in the inventory tab.
Include Cannula Information In Note?: No
Anesthesia Type: 1% lidocaine with epinephrine
Detail Level: Detailed
Number Of Syringes (Required For Inventory): 1
Anesthesia Volume In Cc: 0.5
Additional Anesthesia Volume In Cc: 6
Show Inventory Tab: Show
Consent: Written consent obtained. Risks include but not limited to bruising, beading, irregular texture, ulceration, infection, allergic reaction, scar formation, incomplete augmentation, temporary nature, procedural pain.
Additional Area 1 Location: medial and lateral cheeks.
Additional Area 2 Location: glabella fine lines
Post-Care Instructions: Patient instructed to apply ice to reduce swelling.
Filler: Rona Fisher
Map Statment: See [address] for Complete Details
Additional Area 1 Location: lip line and upper lip fine lines

## 2022-09-29 NOTE — PROCEDURE: BOTOX
Lateral Platysmal Bands Units: 0
Show Additional Area 1: Yes
Show Inventory Tab: Show
Additional Area 5 Location: neck bands
Additional Area 2 Units: [address] [place] [address]
Show Right And Left Periorbital Units: No
Additional Area 3 Location: lateral eyes
Dilution (U/0.1 Cc): 2.5
Consent: Written consent obtained. Risks include but not limited to lid/brow ptosis, bruising, swelling, diplopia, temporary effect, incomplete chemical denervation.
Expiration Date (Month Year): 2024/05
Post-Care Instructions: Patient instructed to not lie down for 4 hours and limit physical activity for 24 hours. Patient instructed not to travel by airplane for 48 hours.
Additional Area 1 Location: glabella
Additional Area 6 Location: under eyes
Additional Area 3 Units: 8
Additional Area 4 Location: bunny lines
Detail Level: Detailed
Additional Area 2 Location: forehead 2.3 cm above brows
Topical Anesthesia?: 20% benzocaine, 8% lidocaine, 4% tetracaine
Lot #: [id number]
Length Of Topical Anesthesia Application (Optional): 15 minutes

## 2022-12-13 ENCOUNTER — APPOINTMENT (RX ONLY)
Dept: URBAN - METROPOLITAN AREA CLINIC 23 | Facility: CLINIC | Age: 42
Setting detail: DERMATOLOGY
End: 2022-12-13

## 2022-12-13 DIAGNOSIS — Z41.9 ENCOUNTER FOR PROCEDURE FOR PURPOSES OTHER THAN REMEDYING HEALTH STATE, UNSPECIFIED: ICD-10-CM

## 2022-12-13 PROCEDURE — ? HYDRAFACIAL

## 2022-12-13 NOTE — PROCEDURE: HYDRAFACIAL
Tip Override
Tip: Hydropeel Tip, Teal
Procedure: Exfoliation
Solution Override
Location: face
Vacuum Pressure High Setting (Will Not Render If Set To 0): 0
Procedure: Fusion
Procedure: Peel
Solution: Beta-HD
Tip Override: yellow
Tip: Hydropeel Tip, Clear
Location Override: scalp
Solution: Antiox-6
Solution: GlySal 7.5%
Procedure: Extraction
Procedure: Boost
Consent: Written consent obtained, risks reviewed including but not limited to crusting, scabbing, blistering, scarring, darker or lighter pigmentary change, bruising, and/or incomplete response.
Price (Use Numbers Only, No Special Characters Or $): [address] [facility]
Post-Care Instructions: I reviewed with the patient in detail post-care instructions. Patient should stay away from the sun and wear sun protection until treated areas are fully healed.
Treatment Number: 1
Tip: Hydropeel Tip, Blue
Solution Override: Soham Comp
Indication: skin texture

## 2022-12-14 ENCOUNTER — APPOINTMENT (RX ONLY)
Dept: URBAN - METROPOLITAN AREA CLINIC 23 | Facility: CLINIC | Age: 42
Setting detail: DERMATOLOGY
End: 2022-12-14

## 2022-12-14 DIAGNOSIS — Z41.9 ENCOUNTER FOR PROCEDURE FOR PURPOSES OTHER THAN REMEDYING HEALTH STATE, UNSPECIFIED: ICD-10-CM

## 2022-12-14 PROCEDURE — ? BOTOX

## 2022-12-14 PROCEDURE — ? MICRONEEDLING

## 2022-12-14 PROCEDURE — ? FILLERS

## 2022-12-14 NOTE — PROCEDURE: BOTOX
Lot #: [id number]
Detail Level: Detailed
Show Lcl Units: No
[facility] Units: 0
Show Topical Anesthesia: Yes
Expiration Date (Month Year): 2024/05
Additional Area 3 Location: glabella
Additional Area 1 Location: [facility]
Dilution (U/0.1 Cc): 2.5
Forehead Units: [address]
Glabellar Complex Units: 6
Show Inventory Tab: Show
Additional Area 4 Location: brows
Additional Area 6 Location: Lip flip
Comments: Lot # [id number]\\nExp: [id number]
Additional Area 2 Location: lateral brows
Periorbital Skin Units: 8
Additional Area 5 Location: lateral eyes
Consent: Written consent obtained. Risks include but not limited to lid/brow ptosis, bruising, swelling, diplopia, temporary effect, incomplete chemical denervation.
Post-Care Instructions: Patient instructed to not lie down for 4 hours and limit physical activity for 24 hours. Patient instructed not to travel by airplane for 48 hours.

## 2022-12-14 NOTE — PROCEDURE: MICRONEEDLING
Topical Anesthesia?: 20% benzocaine, 8% lidocaine, 4% tetracaine
Detail Level: Zone
Depth In Mm: 0.8
Post-Care Instructions: After the procedure, take precautions agains sun exposure. Do not apply sunscreen for 12 hours after the procedure. Do not apply make-up for 12 hours after the procedure. Avoid alcohol based toners for 10-14 days. After 2-3 days patients can return to their regular skin regimen. \\nFace tip Lot # [id number]
Location #1: neck
Price (Use Numbers Only, No Special Characters Or $): [address]
Treatment Number (Optional): 1
Depth In Mm: 2
Consent: Written consent obtained, risks reviewed including but not limited to pain, scarring, infection and incomplete improvement. Patient understands the procedure is cosmetic in nature and will require out of pocket payment.
Infusions (Optional): hyaluronic acid
Depth In Mm: 1.5

## 2022-12-14 NOTE — PROCEDURE: FILLERS
Vermilion Lips Filler Volume In Cc: 0
Anesthesia Type: 1% lidocaine with epinephrine
Additional Area 2 Location: bilateral buttocks, inner thighs
Show Inventory Tab: Show
Additional Area 3 Location: right hand
Number Of Syringes (Required For Inventory): 1
Map Statment: See [address] for Complete Details
Additional Area 4 Location: cesar oral
Consent: Written consent obtained. Risks include but not limited to bruising, beading, irregular texture, ulceration, infection, allergic reaction, scar formation, incomplete augmentation, temporary nature, procedural pain.
Detail Level: Detailed
Include Cannula Information In Note?: No
Inventory Information: This plan will send filler information to inventory based on the fillers you select. Multiple fillers can be sent but you must ensure you select the appropriate fillers in the inventory tab.
Post-Care Instructions: Patient instructed to apply ice to reduce swelling.
Additional Area 1 Location: oral commissures,marionette lines,lateral cheeks
Include Cannula Brand?: DermaSculpt
Additional Area 1 Location: marionette lines, lateral cheeks, nasolabial folds
Additional Area 2 Location: lateral cheeks, lateral mouth, marionette lines
Filler: Restylane-L
Additional Anesthesia Volume In Cc: 6
Additional Area 3 Location: Perioral
Additional Area 4 Location: right cheek
Include Cannula Information In Note?: Yes
Anesthesia Volume In Cc: 0.5
Additional Area 1 Location: using the cannula to cheeks
Additional Area 1 Location: Lt cheeks, lateral nasolabial folds, Rt eyebrow lines,glabella lines

## 2022-12-22 ENCOUNTER — APPOINTMENT (RX ONLY)
Dept: URBAN - METROPOLITAN AREA CLINIC 23 | Facility: CLINIC | Age: 42
Setting detail: DERMATOLOGY
End: 2022-12-22

## 2022-12-22 DIAGNOSIS — Z41.9 ENCOUNTER FOR PROCEDURE FOR PURPOSES OTHER THAN REMEDYING HEALTH STATE, UNSPECIFIED: ICD-10-CM

## 2022-12-22 PROCEDURE — ? BOTOX

## 2022-12-22 NOTE — PROCEDURE: BOTOX
Periorbital Skin Units: 0
Consent: Written consent obtained. Risks include but not limited to lid/brow ptosis, bruising, swelling, diplopia, temporary effect, incomplete chemical denervation.
Show Anterior Platysmal Band Units: Yes
Show Right And Left Periorbital Units: No
Post-Care Instructions: Patient instructed to not lie down for 4 hours and limit physical activity for 24 hours. Patient instructed not to travel by airplane for 48 hours.
Additional Area 3 Location: upper lip
Additional Area 6 Location: Lip flip
Detail Level: Detailed
Lot #: [id number]
Expiration Date (Month Year): 2024/05
Forehead Units: 6
Additional Area 4 Location: brows
Show Inventory Tab: Show
Dilution (U/0.1 Cc): 2.5
Additional Area 1 Location: glabella
Additional Area 5 Location: lateral eyes
Additional Area 2 Location: high forehead 2 cm above brows

## 2023-03-16 ENCOUNTER — APPOINTMENT (RX ONLY)
Dept: URBAN - METROPOLITAN AREA CLINIC 23 | Facility: CLINIC | Age: 43
Setting detail: DERMATOLOGY
End: 2023-03-16

## 2023-03-16 DIAGNOSIS — Z41.9 ENCOUNTER FOR PROCEDURE FOR PURPOSES OTHER THAN REMEDYING HEALTH STATE, UNSPECIFIED: ICD-10-CM

## 2023-03-16 PROCEDURE — ? RECOMMENDATIONS

## 2023-03-16 PROCEDURE — ? PICOWAY LASER

## 2023-03-16 PROCEDURE — ? BOTOX

## 2023-03-16 NOTE — PROCEDURE: BOTOX
Quality 110: Preventive Care And Screening: Influenza Immunization: Influenza immunization was not ordered or administered, reason not given
Additional Area 3 Location: upper lip
Post-Care Instructions: Patient instructed to not lie down for 4 hours and limit physical activity for 24 hours. Patient instructed not to travel by airplane for 48 hours.
Additional Area 6 Units: 0
Show Nasal Units: Yes
Expiration Date (Month Year): 2024/05
Detail Level: Detailed
Lot #: [id number]
Show Right And Left Pupillary Line Units: No
Additional Area 2 Location: chin
Detail Level: Detailed
Periorbital Skin Units: 6
Incrementing Botox Units: By 0.5 Units
Additional Area 5 Location: lateral brows
Additional Area 6 Location: Brigido fleipe
Dilution (U/0.1 Cc): 2.5
Additional Area 1 Location: right eyebrow
Show Inventory Tab: Show
Additional Area 4 Location: neck bands
Consent: Written consent obtained. Risks include but not limited to lid/brow ptosis, bruising, swelling, diplopia, temporary effect, incomplete chemical denervation.
Forehead Units: 8
Glabellar Complex Units: 10

## 2023-03-16 NOTE — PROCEDURE: PICOWAY LASER
Post-Care Instructions: I reviewed with the patient in detail post-care instructions. Patient should avoid sun for a minimum of 4 weeks before and after treatment.
Detail Level: Zone
Frequency (Hz): 6
Handpiece: Resolve
Hide Pulse Duration?: No
Pre-Procedure: Prior to proceeding the treatment areas were cleaned and all present put on their eye protection.
Handpiece: Zoom
Spot Size: 6.0 mm
Fluence (J/Cm2): 0.7
Wavelength: 532 nm
Fluence (J/Cm2): 1.9
Treatment Number: 0
Location Override: full face
Post-Procedure Care: Immediate endpoint: perifollicular erythema and edema. Vaseline and ice applied. Post care reviewed with patient.
Price (Use Numbers Only, No Special Characters Or $): Mario 354
Frequency (Hz): [employer]
Treatment Number: 1
Consent: Written consent obtained, risks reviewed including but not limited to crusting, scabbing, blistering, scarring, darker or lighter pigmentary change, paradoxical hair regrowth, incomplete removal of hair and infection.
Fluence (J/Cm2): 0.8
Wavelength: 1064 nm

## 2023-03-16 NOTE — PROCEDURE: RECOMMENDATIONS
Detail Level: Zone
Render Risk Assessment In Note?: no
Recommendations (Free Text): Picoway full face and PRP

## 2023-03-31 ENCOUNTER — APPOINTMENT (RX ONLY)
Dept: URBAN - METROPOLITAN AREA CLINIC 23 | Facility: CLINIC | Age: 43
Setting detail: DERMATOLOGY
End: 2023-03-31

## 2023-03-31 DIAGNOSIS — Z41.9 ENCOUNTER FOR PROCEDURE FOR PURPOSES OTHER THAN REMEDYING HEALTH STATE, UNSPECIFIED: ICD-10-CM

## 2023-03-31 PROCEDURE — ? HYDRAFACIAL

## 2023-03-31 NOTE — PROCEDURE: HYDRAFACIAL
Procedure: Fusion
Solution: Beta-HD
Tip Override
Vacuum Pressure High Setting (Will Not Render If Set To 0): 0
Consent: Written consent obtained, risks reviewed including but not limited to crusting, scabbing, blistering, scarring, darker or lighter pigmentary change, bruising, and/or incomplete response.
Solution Override
Location Override: d?collet? and face
Solution: GlySal 7.5%
Tip: Hydropeel Tip, Clear
Post-Care Instructions: I reviewed with the patient in detail post-care instructions. Patient should stay away from the sun and wear sun protection until treated areas are fully healed.
Tip Override: yellow
Treatment Number: 1
Price (Use Numbers Only, No Special Characters Or $): 140[address] [facility]
Tip: Hydropeel Tip, Blue
Procedure: Boost
Tip: Hydropeel Tip, Teal
Solution: Antiox-6
Indication: skin texture
Procedure: Exfoliation
Procedure: Extraction
Solution Override: Cloyce False [place]
Lymphatic Therapy: yes
Location: face
Procedure: Peel

## 2023-04-21 ENCOUNTER — APPOINTMENT (RX ONLY)
Dept: URBAN - METROPOLITAN AREA CLINIC 23 | Facility: CLINIC | Age: 43
Setting detail: DERMATOLOGY
End: 2023-04-21

## 2023-04-21 DIAGNOSIS — Z41.9 ENCOUNTER FOR PROCEDURE FOR PURPOSES OTHER THAN REMEDYING HEALTH STATE, UNSPECIFIED: ICD-10-CM

## 2023-04-21 PROCEDURE — ? SCULPTRA

## 2023-04-21 NOTE — PROCEDURE: SCULPTRA
Anesthesia Type: 0.2% lidocaine (mixed within filler)
Detail Level: Detailed
Show Mental Crease Volume?: Yes
Map Statement: See Attached Map for Complete Details.
Additional Area 1 Volume In Cc: 0
Show Right And Left Nasolabial Fold Volume?: No
Additional Area 5 Location: Lt elbow
Additional Area 2 Location: Rt thigh
Anesthesia Volume In Cc: 2
Dilution Method: The Sculptra was diluted with 7 cc?s of saline water and 1cc of plain lidocaine  for a total volume of 8cc per vial.
Show Inventory Tab: Show
Expiration Date (Month Year): 2024-04-30
Injection Technique: The Sculptra was injected using a cannula to the listed areas after cleansing the skin and providing appropriate anesthesia.
Volumizer: Sculptra
Additional Area 3 Location: Lt thigh
Consent: Written consent obtained. Risks include but not limited to bruising, beading, irregular texture, ulceration, infection, allergic reaction, scar formation, incomplete augmentation, temporary nature, procedural pain.
Temple Hollows Volume In Cc: 4
Additional Area 4 Location: Right elbow
Vials Reconstituted: 1
Additional Area 1 Location: abdomen
Post-Care Instructions: Patient instructed to apply ice to reduce swelling.
Lot #: [id number]

## 2023-05-30 ENCOUNTER — APPOINTMENT (RX ONLY)
Dept: URBAN - METROPOLITAN AREA CLINIC 23 | Facility: CLINIC | Age: 43
Setting detail: DERMATOLOGY
End: 2023-05-30

## 2023-05-30 DIAGNOSIS — Z41.9 ENCOUNTER FOR PROCEDURE FOR PURPOSES OTHER THAN REMEDYING HEALTH STATE, UNSPECIFIED: ICD-10-CM

## 2023-05-30 PROCEDURE — ? BOTOX

## 2023-05-30 PROCEDURE — ? RECOMMENDATIONS

## 2023-05-30 NOTE — PROCEDURE: RECOMMENDATIONS
Render Risk Assessment In Note?: no
Recommendations (Free Text): Matrix pro (face area) 2 or 3 session \\nSculptra quoted $900 (temple area)
Detail Level: Detailed

## 2023-05-30 NOTE — PROCEDURE: BOTOX
Masseter Units: 0
Show Glabellar Units: Yes
Additional Area 1 Location: high forehead
Show Right And Left Pupillary Line Units: No
Detail Level: Detailed
Consent: Written consent obtained. Risks include but not limited to lid/brow ptosis, bruising, swelling, diplopia, temporary effect, incomplete chemical denervation.
Additional Area 1 Units: [address] [place] [address]
Glabellar Complex Units: 10
Lot #: [id number]
Post-Care Instructions: Patient instructed to not lie down for 4 hours and limit physical activity for 24 hours. Patient instructed not to travel by airplane for 48 hours.
Additional Area 6 Location: Brigido felipe
Dilution (U/0.1 Cc): 2.5
Additional Area 5 Location: lateral brows
Incrementing Botox Units: By 0.5 Units
Additional Area 3 Location: lateral eyes
Show Inventory Tab: Show
Expiration Date (Month Year): 2024/05
Additional Area 2 Location: glabella

## 2023-06-27 ENCOUNTER — NON-APPOINTMENT (OUTPATIENT)
Age: 43
End: 2023-06-27

## 2023-06-27 ENCOUNTER — APPOINTMENT (OUTPATIENT)
Dept: OPHTHALMOLOGY | Facility: CLINIC | Age: 43
End: 2023-06-27
Payer: COMMERCIAL

## 2023-06-27 PROCEDURE — 92132 CPTRZD OPH DX IMG ANT SGM: CPT

## 2023-06-27 PROCEDURE — 92012 INTRM OPH EXAM EST PATIENT: CPT

## 2023-06-28 ENCOUNTER — NON-APPOINTMENT (OUTPATIENT)
Age: 43
End: 2023-06-28

## 2023-06-28 ENCOUNTER — APPOINTMENT (OUTPATIENT)
Dept: OPHTHALMOLOGY | Facility: CLINIC | Age: 43
End: 2023-06-28
Payer: COMMERCIAL

## 2023-06-28 PROCEDURE — 92020 GONIOSCOPY: CPT

## 2023-06-28 PROCEDURE — 99213 OFFICE O/P EST LOW 20 MIN: CPT

## 2023-06-29 PROBLEM — Z00.00 ENCOUNTER FOR PREVENTIVE HEALTH EXAMINATION: Status: ACTIVE | Noted: 2023-06-29

## 2023-07-18 ENCOUNTER — NON-APPOINTMENT (OUTPATIENT)
Age: 43
End: 2023-07-18

## 2023-07-18 ENCOUNTER — APPOINTMENT (OUTPATIENT)
Dept: OPHTHALMOLOGY | Facility: CLINIC | Age: 43
End: 2023-07-18
Payer: COMMERCIAL

## 2023-07-18 PROCEDURE — 66761 REVISION OF IRIS: CPT | Mod: RT

## 2023-07-25 ENCOUNTER — APPOINTMENT (OUTPATIENT)
Dept: OPHTHALMOLOGY | Facility: CLINIC | Age: 43
End: 2023-07-25
Payer: COMMERCIAL

## 2023-07-25 ENCOUNTER — NON-APPOINTMENT (OUTPATIENT)
Age: 43
End: 2023-07-25

## 2023-07-25 PROCEDURE — 66761 REVISION OF IRIS: CPT | Mod: 78,LT

## 2023-08-15 ENCOUNTER — APPOINTMENT (OUTPATIENT)
Dept: OPHTHALMOLOGY | Facility: CLINIC | Age: 43
End: 2023-08-15
Payer: COMMERCIAL

## 2023-08-15 ENCOUNTER — NON-APPOINTMENT (OUTPATIENT)
Age: 43
End: 2023-08-15

## 2023-08-15 PROCEDURE — 92014 COMPRE OPH EXAM EST PT 1/>: CPT

## 2023-09-08 ENCOUNTER — APPOINTMENT (RX ONLY)
Dept: URBAN - METROPOLITAN AREA CLINIC 23 | Facility: CLINIC | Age: 43
Setting detail: DERMATOLOGY
End: 2023-09-08

## 2023-09-08 DIAGNOSIS — Z41.9 ENCOUNTER FOR PROCEDURE FOR PURPOSES OTHER THAN REMEDYING HEALTH STATE, UNSPECIFIED: ICD-10-CM

## 2023-09-08 PROCEDURE — ? INVENTORY

## 2023-09-08 PROCEDURE — ? RF MICRONEEDLING

## 2023-09-08 NOTE — PROCEDURE: RF MICRONEEDLING
Location #5: Upper Lip
Laser: Endymed Intensif
Laser Override (Optional): MatrixPro
Indication: dyschromia
Information: You must select either a Location or Location Override for the laser information to render in the note
Treatment Number (Optional): 0
Location #3: Forehead
Location #1: Cheeks
Depth In Mm: 1.5
Pulse Width In Msec (Optional): 1.2
Consent: Written consent obtained, risks reviewed including but not limited to pain, scarring, infection and incomplete improvement. Patient understands the procedure is cosmetic in nature and will require out of pocket payment.
Post-Care Instructions: After the procedure, take precautions agains sun exposure. Do not apply sunscreen for 12 hours after the procedure. Do not apply make-up for 12 hours after the procedure. Avoid alcohol based toners for 10-14 days. After 2-3 days patients can return to their regular skin regimen.
Rf Time In Msec (Optional): 0.5
Detail Level: Simple
Anesthesia Type: 1% lidocaine with epinephrine and a 1:10 solution of 8.4% sodium bicarbonate
Rf Time In Msec (Optional): 1.0
Location #2: Chin
Pulse Width In Msec (Optional): 2.2
Depth In Mm: 1.4
Depth In Mm: 1
Price (Use Numbers Only, No Special Characters Or $): [address] [facility]
Depth In Mm: 1.8
Location #4: Nose
Pre-Procedure Text: The treatment areas were cleansed in the usual fashion and treatment proceeded as outlined above.

## 2023-10-10 ENCOUNTER — APPOINTMENT (RX ONLY)
Dept: URBAN - METROPOLITAN AREA CLINIC 23 | Facility: CLINIC | Age: 43
Setting detail: DERMATOLOGY
End: 2023-10-10

## 2023-10-10 DIAGNOSIS — Z41.9 ENCOUNTER FOR PROCEDURE FOR PURPOSES OTHER THAN REMEDYING HEALTH STATE, UNSPECIFIED: ICD-10-CM

## 2023-10-10 PROCEDURE — ? FILLERS

## 2023-10-10 PROCEDURE — ? RECOMMENDATIONS

## 2023-10-10 PROCEDURE — ? INVENTORY

## 2023-10-10 PROCEDURE — ? BOTOX

## 2023-10-10 NOTE — PROCEDURE: RECOMMENDATIONS
Detail Level: Detailed
Recommendations (Free Text): Skinvive 1 syringe \\nMatrixPro full face $1,200.00
Render Risk Assessment In Note?: no

## 2023-10-10 NOTE — PROCEDURE: FILLERS
Show Inventory Tab: Show
Cheeks Filler Volume In Cc: 0
Filler Comments: Freedom Guzman
Anesthesia Volume In Cc: 0.5
Additional Area 4 Location: cesar oral
Include Cannula Information In Note?: No
Number Of Syringes (Required For Inventory): 1
Additional Area 1 Location: Marionette lines, nasolabial folds
Additional Area 1 Location: Nasolabial folds, oral commissures
Include Cannula Brand?: DermaSculpt
Additional Area 2 Location: nasal folds,marionette lines, forehead
Filler: Juvederm Volbella XC
Consent: Written consent obtained. Risks include but not limited to bruising, beading, irregular texture, ulceration, infection, allergic reaction, scar formation, incomplete augmentation, temporary nature, procedural pain.
Include Cannula Size?: 25G
Map Statment: See [address] for Complete Details
Additional Area 1 Location: NLFs, marionette lines, vermillion lips
Inventory Information: This plan will send filler information to inventory based on the fillers you select. Multiple fillers can be sent but you must ensure you select the appropriate fillers in the inventory tab.
Additional Area 1 Location: lateral jaw
Detail Level: Detailed
Additional Area 4 Location: upper lip , lower lip, lip stick lines, nasal folds
Post-Care Instructions: Patient instructed to apply ice to reduce swelling.
Additional Area 2 Location: cesar oral fine lines, marionette lines, nasal folds, lips
Additional Area 2 Location: cesar oral fine lines
Include Cannula Length?: 1.5 inch
Additional Area 5 Location: chin
Additional Area 3 Location: lateral cheeks, marionette lines

## 2023-10-10 NOTE — PROCEDURE: BOTOX
R Brow Units: 0
Show Ucl Units: No
Additional Area 5 Location: lateral brows
Show Anterior Platysmal Band Units: Yes
Additional Area 1 Units: [address] [place] [address]
Additional Area 4 Location: neck bands
Detail Level: Detailed
Consent: Written consent obtained. Risks include but not limited to lid/brow ptosis, bruising, swelling, diplopia, temporary effect, incomplete chemical denervation.
Additional Area 3 Location: lateral eyes
Lot #: [id number]
Incrementing Botox Units: By 0.5 Units
Additional Area 2 Location: chin
Post-Care Instructions: Patient instructed to not lie down for 4 hours and limit physical activity for 24 hours. Patient instructed not to travel by airplane for 48 hours.
Additional Area 6 Location: neckbands
Expiration Date (Month Year): 2024/05
Show Inventory Tab: Show
Dilution (U/0.1 Cc): 2.5
Additional Area 1 Location: High forehead

## 2023-11-30 ENCOUNTER — APPOINTMENT (RX ONLY)
Dept: URBAN - METROPOLITAN AREA CLINIC 23 | Facility: CLINIC | Age: 43
Setting detail: DERMATOLOGY
End: 2023-11-30

## 2023-11-30 DIAGNOSIS — Z41.9 ENCOUNTER FOR PROCEDURE FOR PURPOSES OTHER THAN REMEDYING HEALTH STATE, UNSPECIFIED: ICD-10-CM

## 2023-11-30 PROCEDURE — ? MICRONEEDLING

## 2023-11-30 PROCEDURE — ? IN-HOUSE DISPENSING PHARMACY

## 2023-11-30 PROCEDURE — ? INVENTORY

## 2023-11-30 NOTE — PROCEDURE: MICRONEEDLING
Depth In Mm: 1
Treatment Number (Optional): 0
Detail Level: Zone
Consent: Written consent obtained, risks reviewed including but not limited to pain, scarring, infection and incomplete improvement. Patient understands the procedure is cosmetic in nature and will require out of pocket payment.
Location #1: face
Depth In Mm: 0.1
Topical Anesthesia?: BLT (benzocaine 23%, lidocaine 10%, tetracaine 10%)
Infusions (Optional): hyaluronic acid
Price (Use Numbers Only, No Special Characters Or $): [address]

## 2023-11-30 NOTE — PROCEDURE: IN-HOUSE DISPENSING PHARMACY
Product 1 Unit Type: ml
Product 5 Unit Type: grams
Product 73 Amount/Unit (Numbers Only): 0
Product 4 Refills: 1
Product 8 Unit Type: mg
Name Of Product 3: Valium
Name Of Product 12: Laly Napier
Product 4 Amount/Unit (Numbers Only): [id number] [address]
Product 7 Amount/Unit (Numbers Only): 10
Product 30 Application Directions: Apply one drop in each eye daily
Name Of Product 8: Prednisone
Product 3 Amount/Unit (Numbers Only): 5
Name Of Product 11: Latisse
Product 12 Amount/Unit (Numbers Only): 0.3
Product 2 Application Directions: Apply to face 2 times daily.
Product 5 Application Directions: Apply one pea sized amount to entire face at bedtime
Product 8 Application Directions: Take half (10mg) the day post procedure and second half the following day but only if needed
Product 1 Application Directions: QHS
Product 11 Application Directions: Apply one drop to upper lash line at bedtime
Product 2 Unit Type: jar(s)
Name Of Product 30: Cristóbal Sargent
Render Product Pricing In Note: Yes
Product 4 Application Directions: Apply pea size amount to face at bedtime
Product 7 Application Directions: Take 2 tablets today and one each day for the next 2 days
Product 10 Application Directions: Take one pill daily for 3 days
Name Of Product 2: Hydroquinone pads
Product 30 Amount/Unit (Numbers Only): .3
Name Of Product 5: Tretinoin 0.05%
Product 12 Application Directions: Apply one drop to each eye.
Detail Level: Zone
Name Of Product 4: Hydroquinone/tretinoin/phos
Name Of Product 10: Valtrex
Product 5 Amount/Unit (Numbers Only): [address] [facility]
Product 8 Amount/Unit (Numbers Only): 20

## 2023-12-15 ENCOUNTER — APPOINTMENT (RX ONLY)
Dept: URBAN - METROPOLITAN AREA CLINIC 23 | Facility: CLINIC | Age: 43
Setting detail: DERMATOLOGY
End: 2023-12-15

## 2023-12-15 DIAGNOSIS — Z41.9 ENCOUNTER FOR PROCEDURE FOR PURPOSES OTHER THAN REMEDYING HEALTH STATE, UNSPECIFIED: ICD-10-CM

## 2023-12-15 PROCEDURE — ? DERMAPLANE

## 2023-12-15 PROCEDURE — ? DELUXE HYDRAFACIAL

## 2023-12-15 ASSESSMENT — LOCATION SIMPLE DESCRIPTION DERM: LOCATION SIMPLE: LEFT CHEEK

## 2023-12-15 ASSESSMENT — LOCATION DETAILED DESCRIPTION DERM: LOCATION DETAILED: LEFT INFERIOR MEDIAL MALAR CHEEK

## 2023-12-15 ASSESSMENT — LOCATION ZONE DERM: LOCATION ZONE: FACE

## 2023-12-15 NOTE — PROCEDURE: DELUXE HYDRAFACIAL
Number Of Passes: 0
Procedure: Peel
Tip Override
Location: face
Tip: Hydropeel Tip, Clear
Procedure: Extend and Protect
Solution: Activ-4
Tip: Hydropeel Tip, Teal
Solution: GlySal 7.5%
Price (Use Numbers Only, No Special Characters Or $): 926
Procedure: Fusion
Procedure: Extraction
Procedure: Boost
Solution Override
Solution: Beta-HD
Indication: skin texture
Tip: Hydropeel Tip, Blue
Procedure: Exfoliation
Consent: Written consent obtained, risks reviewed including but not limited to crusting, scabbing, blistering, scarring, darker or lighter pigmentary change, bruising, and/or incomplete response.
Post-Care Instructions: I reviewed with the patient in detail post-care instructions. Patient should stay away from the sun and wear sun protection until treated areas are fully healed.

## 2023-12-15 NOTE — PROCEDURE: DERMAPLANE
Post-Care Instructions: I reviewed with the patient in detail post-care instructions.
Detail Level: Zone
Treatment Area Prep: alcohol
Blade: 10 blade scalpel
Treatment Areas: face
Post-Procedure Instructions: Following the dermaplane procedure, Oxymist treatment was applied to the treatment areas. Moisturizer and SPF was applied.
Price (Use Numbers Only, No Special Characters Or $): 100
Pre-Procedure Text: The patient was placed in a recumbant position on the procedure table.

## 2023-12-22 ENCOUNTER — APPOINTMENT (RX ONLY)
Dept: URBAN - METROPOLITAN AREA CLINIC 23 | Facility: CLINIC | Age: 43
Setting detail: DERMATOLOGY
End: 2023-12-22

## 2023-12-22 DIAGNOSIS — Z41.9 ENCOUNTER FOR PROCEDURE FOR PURPOSES OTHER THAN REMEDYING HEALTH STATE, UNSPECIFIED: ICD-10-CM

## 2023-12-22 PROCEDURE — ? VBEAM PERFECTA LASER

## 2023-12-22 PROCEDURE — ? FILLERS

## 2023-12-22 PROCEDURE — ? BOTOX

## 2023-12-22 NOTE — PROCEDURE: VBEAM PERFECTA LASER
Post-Procedure: Following the procedure the post care instructions were reviewed with the patient.
Spray Time (Ms): 0
Is There A Third Setting?: no
Pulse Duration: 40 ms
Treatment Number: 1
Spot Size: 10 mm
Location: face
Fluence (J/Cm2): 6.50
Consent: Written consent obtained.  Risks were reviewed including but not limited to crusting, scabbing, blistering, scarring, darker or lighter pigmentary change, bruising, and/or incomplete response.
Detail Level: Zone
Post-Care Instructions: I reviewed with the patient in detail post-care instructions.  Cool compresses or cold gel packs may be applied after treatment.  Exposure to the sun should be avoided and sun protection and sunscreen should be used.
Spot Size: 3x10 mm
Pulse Duration: 6 ms
Pre-Procedure: The treatment areas identified by the patient were cleansed and treatment was performed with the parameters mentioned above.

## 2023-12-22 NOTE — PROCEDURE: BOTOX
R Brow Units: 0
Show Anterior Platysmal Band Units: Yes
Periorbital Skin Units: 14
Additional Area 3 Location: upper cutaneous lip
Show Right And Left Periorbital Units: No
Additional Area 2 Location: high forehead
Detail Level: Detailed
Consent: Written consent obtained. Risks include but not limited to lid/brow ptosis, bruising, swelling, diplopia, temporary effect, incomplete chemical denervation.
Incrementing Botox Units: By 0.5 Units
Lot #: [id number]
Expiration Date (Month Year): 2024/05
Additional Area 1 Location: bunny lines
Post-Care Instructions: Patient instructed to not lie down for 4 hours and limit physical activity for 24 hours. Patient instructed not to travel by airplane for 48 hours.
Show Inventory Tab: Show
Additional Area 4 Location: lateral brows
Dilution (U/0.1 Cc): 2.5

## 2023-12-22 NOTE — PROCEDURE: FILLERS
Additional Area 1 Volume In Cc: 1
Additional Area 2 Location: Temples
Mid Face Filler Volume In Cc: 0
Additional Area 2 Location: cesar oral fine lines, marionette lines, nasal folds
Use Map Statement For Sites (Optional): No
Filler: Rona Fisher
Additional Area 3 Location: cheeks, jawline
Additional Area 3 Location: lateral cheeks, nasolabial folds,oral commisures, vermillion lips
Map Statment: See Attach Map for Complete Details
Additional Area 4 Location: cesar oral
Additional Area 4 Location: lip liner, lip. nasal folds, marionette lines.
Consent: Written consent obtained. Risks include but not limited to bruising, beading, irregular texture, ulceration, infection, allergic reaction, scar formation, incomplete augmentation, temporary nature, procedural pain.
Additional Area 1 Location: cheek fine lines
Inventory Information: This plan will send filler information to inventory based on the fillers you select. Multiple fillers can be sent but you must ensure you select the appropriate fillers in the inventory tab.
Additional Area 5 Location: chin
Post-Care Instructions: Patient instructed to apply ice to reduce swelling.
Detail Level: Detailed
Anesthesia Volume In Cc: 0.5
Show Inventory Tab: Show
Include Cannula Brand?: DermaSculpt
Additional Area 1 Location: lateral jaw
Include Cannula Size?: 25G
Include Cannula Information In Note?: Yes
Additional Area 2 Location: cesar oral fine lines
Include Cannula Length?: 1.5 inch
Additional Area 1 Location: Marionette Lines, oral commesure,lateral jaw
Additional Area 1 Location: lateral cheeks, hairline

## 2024-01-10 ENCOUNTER — APPOINTMENT (RX ONLY)
Dept: URBAN - METROPOLITAN AREA CLINIC 23 | Facility: CLINIC | Age: 44
Setting detail: DERMATOLOGY
End: 2024-01-10

## 2024-01-10 DIAGNOSIS — Z41.9 ENCOUNTER FOR PROCEDURE FOR PURPOSES OTHER THAN REMEDYING HEALTH STATE, UNSPECIFIED: ICD-10-CM

## 2024-01-10 PROCEDURE — ? MICRONEEDLING

## 2024-01-10 NOTE — PROCEDURE: MICRONEEDLING
Consent: Written consent obtained, risks reviewed including but not limited to pain, scarring, infection and incomplete improvement.  Patient understands the procedure is cosmetic in nature and will require out of pocket payment.
Depth In Mm (Location #1): 1
Depth In Mm (Location #3): 0.1
# Of Treatments In Package: 0
Price (Use Numbers Only, No Special Characters Or $): 500
Detail Level: Zone
Location #1: neck
Topical Anesthesia?: BLT cream (benzocaine 20%, lidocaine 10%, tetracaine 10%)
Infusions (Optional): hyaluronic acid

## 2024-02-07 ENCOUNTER — APPOINTMENT (RX ONLY)
Dept: URBAN - METROPOLITAN AREA CLINIC 23 | Facility: CLINIC | Age: 44
Setting detail: DERMATOLOGY
End: 2024-02-07

## 2024-02-07 DIAGNOSIS — Z41.9 ENCOUNTER FOR PROCEDURE FOR PURPOSES OTHER THAN REMEDYING HEALTH STATE, UNSPECIFIED: ICD-10-CM

## 2024-02-07 PROCEDURE — ? DELUXE HYDRAFACIAL

## 2024-02-07 ASSESSMENT — LOCATION ZONE DERM: LOCATION ZONE: SCALP

## 2024-02-07 ASSESSMENT — LOCATION DETAILED DESCRIPTION DERM: LOCATION DETAILED: LEFT CENTRAL OCCIPITAL SCALP

## 2024-02-07 ASSESSMENT — LOCATION SIMPLE DESCRIPTION DERM: LOCATION SIMPLE: SCALP

## 2024-02-07 NOTE — PROCEDURE: DELUXE HYDRAFACIAL
Number Of Passes: 0
Tip: Hydropeel Tip, Blue
Solution: Beta-HD
Tip Override
Procedure: Exfoliation
Solution Override
Tip: Hydropeel Tip, Teal
Solution: GlySal 7.5%
Procedure: Fusion
Procedure: Extend and Protect
Location: face
Procedure: Extraction
Solution: Activ-4
Tip: Hydropeel Tip, Clear
Price (Use Numbers Only, No Special Characters Or $): 702
Consent: Written consent obtained, risks reviewed including but not limited to crusting, scabbing, blistering, scarring, darker or lighter pigmentary change, bruising, and/or incomplete response.
Post-Care Instructions: I reviewed with the patient in detail post-care instructions. Patient should stay away from the sun and wear sun protection until treated areas are fully healed.
Indication: skin texture
Procedure: Boost
Treatment Number: 1
Procedure: Peel

## 2024-02-16 ENCOUNTER — APPOINTMENT (RX ONLY)
Dept: URBAN - METROPOLITAN AREA CLINIC 23 | Facility: CLINIC | Age: 44
Setting detail: DERMATOLOGY
End: 2024-02-16

## 2024-02-16 DIAGNOSIS — Z41.9 ENCOUNTER FOR PROCEDURE FOR PURPOSES OTHER THAN REMEDYING HEALTH STATE, UNSPECIFIED: ICD-10-CM

## 2024-02-16 PROCEDURE — ? PRX-T33 BIOREVITALIZATION TREATMENT

## 2024-02-16 PROCEDURE — ? INVENTORY

## 2024-02-16 PROCEDURE — ? IN-HOUSE DISPENSING PHARMACY

## 2024-02-16 NOTE — PROCEDURE: PRX-T33 BIOREVITALIZATION TREATMENT
Number Of Layers: 3
Post-Care Instructions: I reviewed with the patient in detail post-care instructions. Patient should avoid sun exposure and wear sun protection.
Post Peel Care: After the procedure, the treatment areas were washed and moisturizing cream was applied. Sun protection and post-care instructions were reviewed with the patient.
Expiration Date (Optional): 10/2024
Treatment Number: 1
Detail Level: Zone
Price (Use Numbers Only, No Special Characters Or $): 400
Chemical Peel: PRX-T33
Consent: Prior to the procedure, consent was obtained and risks were reviewed, including but not limited to: redness, late exfoliation and dark spots which all can take up to a few days to resolve.
Prep: Prior to starting treatment the patient was asked to wash the treatment areas with facial cleanser. The treatment areas were degreased with PRX-T prep solution.
Lot Number (Optional): [id number]
no

## 2024-02-16 NOTE — PROCEDURE: IN-HOUSE DISPENSING PHARMACY
Product 49 Refills: 0
Product 74 Unit Type: mg
Product 12 Unit Type: ml
Product 1 Amount/Unit (Numbers Only): 5
Detail Level: Zone
Product 11 Refills: 1
Name Of Product 30: Upneeq
Send Charges To Patient Encounter: Yes
Name Of Product 10: Valtrex
Product 8 Amount/Unit (Numbers Only): 20
Product 5 Unit Type: grams
Product 2 Application Directions: Apply to face 2 times daily.
Product 30 Amount/Unit (Numbers Only): .3
Product 12 Application Directions: Apply one drop to each eye.
Product 2 Unit Type: jar(s)
Product 7 Amount/Unit (Numbers Only): 10
Product 4 Amount/Unit (Numbers Only): 30
Name Of Product 3: Valium
Product 1 Application Directions: QHS
Name Of Product 8: Prednisone
Name Of Product 5: Tretinoin 0.05%
Product 11 Application Directions: Apply one drop to upper lash line at bedtime
Name Of Product 2: Hydroquinone pads
Product 8 Application Directions: Take half (10mg) the day post procedure and second half the following day but only if needed
Product 5 Application Directions: Apply one pea sized amount to entire face at bedtime
Product 30 Application Directions: Apply one drop in each eye daily
Product 10 Application Directions: Take one pill daily for 3 days
Name Of Product 4: Hydroquinone/tretinoin/phos
Name Of Product 1: Latisse
Product 7 Application Directions: Take 2 tablets today and one each day for the next 2 days
Product 12 Amount/Unit (Numbers Only): 0.3
Product 4 Application Directions: Apply pea size amount to face at bedtime

## 2024-03-08 ENCOUNTER — APPOINTMENT (RX ONLY)
Dept: URBAN - METROPOLITAN AREA CLINIC 23 | Facility: CLINIC | Age: 44
Setting detail: DERMATOLOGY
End: 2024-03-08

## 2024-03-08 DIAGNOSIS — Z41.9 ENCOUNTER FOR PROCEDURE FOR PURPOSES OTHER THAN REMEDYING HEALTH STATE, UNSPECIFIED: ICD-10-CM

## 2024-03-08 PROCEDURE — ? PRX-T33 BIOREVITALIZATION TREATMENT

## 2024-03-08 PROCEDURE — ? INVENTORY

## 2024-03-08 PROCEDURE — ? IPL HAIR REMOVAL

## 2024-03-08 NOTE — PROCEDURE: IPL HAIR REMOVAL
Filter/Handpiece: 
Pulse Duration (In Ms): 20 ms
Price $ (Use Numbers Only, No Text Please.): 500
Pre-Procedure: Prior to proceeding the treatment areas were cleaned and all present put on their eye protection.
# Of Treatments In Package: 0
Render Post-Care In The Note: No
Post-Procedure Care: Immediate endpoint: perifollicular erythema and edema. Vaseline and ice applied. Post care reviewed with patient.
Treatment Number: 1
Detail Level: Detailed
Length Of Topical Anesthesia Application (Optional): 15 minutes
Laser Type: Funmilayo Orta
Consent: Written consent obtained, risks reviewed including but not limited to crusting, scabbing, blistering, scarring, darker or lighter pigmentary change, paradoxical hair regrowth, incomplete removal of hair and infection.
Post-Care Instructions: I reviewed with the patient in detail post-care instructions. Patient should avoid sun for a minimum of 4 weeks before and after treatment.
Fluence: 13 j/cm2
Topical Anesthesia Type: BLT cream (benzocaine 20%, lidocaine 10%, tetracaine 10%)

## 2024-03-08 NOTE — PROCEDURE: PRX-T33 BIOREVITALIZATION TREATMENT
Number Of Layers: 3
Post-Care Instructions: I reviewed with the patient in detail post-care instructions. Patient should avoid sun exposure and wear sun protection.
Expiration Date (Optional): 10/2024
Treatment Number: 2
Chemical Peel: PRX-T33
Prep: Prior to starting treatment the patient was asked to wash the treatment areas with facial cleanser. The treatment areas were degreased with PRX-T prep solution.
Consent: Prior to the procedure, consent was obtained and risks were reviewed, including but not limited to: redness, late exfoliation and dark spots which all can take up to a few days to resolve.
Lot Number (Optional): [id number]
Detail Level: Zone
Price (Use Numbers Only, No Special Characters Or $): 400
Post Peel Care: After the procedure, the treatment areas were washed and moisturizing cream was applied. Sun protection and post-care instructions were reviewed with the patient.

## 2024-03-21 ENCOUNTER — APPOINTMENT (RX ONLY)
Dept: URBAN - METROPOLITAN AREA CLINIC 23 | Facility: CLINIC | Age: 44
Setting detail: DERMATOLOGY
End: 2024-03-21

## 2024-03-21 DIAGNOSIS — Z41.9 ENCOUNTER FOR PROCEDURE FOR PURPOSES OTHER THAN REMEDYING HEALTH STATE, UNSPECIFIED: ICD-10-CM

## 2024-03-21 PROCEDURE — ? PRX-T33 BIOREVITALIZATION TREATMENT

## 2024-03-21 PROCEDURE — ? INVENTORY

## 2024-03-21 NOTE — PROCEDURE: PRX-T33 BIOREVITALIZATION TREATMENT
Expiration Date (Optional): 10/2024
Post Peel Care: After the procedure, the treatment areas were washed and moisturizing cream was applied. Sun protection and post-care instructions were reviewed with the patient.
Chemical Peel: PRX-T33
Prep: Prior to starting treatment the patient was asked to wash the treatment areas with facial cleanser. The treatment areas were degreased with PRX-T prep solution.
Number Of Layers: 3
Detail Level: Zone
Consent: Prior to the procedure, consent was obtained and risks were reviewed, including but not limited to: redness, late exfoliation and dark spots which all can take up to a few days to resolve.
Lot Number (Optional): [id number]
Price (Use Numbers Only, No Special Characters Or $): 400
Post-Care Instructions: I reviewed with the patient in detail post-care instructions. Patient should avoid sun exposure and wear sun protection.

## 2024-03-26 ENCOUNTER — APPOINTMENT (RX ONLY)
Dept: URBAN - METROPOLITAN AREA CLINIC 23 | Facility: CLINIC | Age: 44
Setting detail: DERMATOLOGY
End: 2024-03-26

## 2024-03-26 DIAGNOSIS — Z41.9 ENCOUNTER FOR PROCEDURE FOR PURPOSES OTHER THAN REMEDYING HEALTH STATE, UNSPECIFIED: ICD-10-CM

## 2024-03-26 PROCEDURE — ? BOTOX

## 2024-03-26 PROCEDURE — ? FILLERS

## 2024-03-26 NOTE — PROCEDURE: BOTOX
R Brow Units: 0
Show Anterior Platysmal Band Units: Yes
Periorbital Skin Units: 14
Additional Area 3 Location: upper cutaneous lip
Show Right And Left Periorbital Units: No
Additional Area 2 Location: high forehead
Detail Level: Detailed
Consent: Written consent obtained. Risks include but not limited to lid/brow ptosis, bruising, swelling, diplopia, temporary effect, incomplete chemical denervation.
Incrementing Botox Units: By 0.5 Units
Lot #: [id number]
Expiration Date (Month Year): 2024/05
Additional Area 1 Location: bunny lines
Post-Care Instructions: Patient instructed to not lie down for 4 hours and limit physical activity for 24 hours. Patient instructed not to travel by airplane for 48 hours.
Show Inventory Tab: Show
Additional Area 4 Location: lateral brows
Dilution (U/0.1 Cc): 2.5

## 2024-03-26 NOTE — PROCEDURE: FILLERS
Include Cannula Brand?: DermaSculpt
Vermilion Lips Filler Volume In Cc: 0
Consent: Written consent obtained. Risks include but not limited to bruising, beading, irregular texture, ulceration, infection, allergic reaction, scar formation, incomplete augmentation, temporary nature, procedural pain.
Additional Area 1 Location: Cheeks, Jawline, Marionette lines
Post-Care Instructions: Patient instructed to apply ice to reduce swelling.
Include Cannula Information In Note?: No
Additional Area 2 Location: cesar oral
Additional Area 1 Volume In Cc: 1
Additional Area 2 Location: jawline, cheeks
Additional Area 1 Location: lateral jaw
Include Cannula Size?: 25G
Filler: Rona Fisher
Additional Area 3 Location: vermillion lips, upper lip fine lines
Additional Area 2 Location: cesar oral fine lines
Inventory Information: This plan will send filler information to inventory based on the fillers you select. Multiple fillers can be sent but you must ensure you select the appropriate fillers in the inventory tab.
Include Cannula Length?: 1.5 inch
Detail Level: Detailed
Additional Area 4 Location: lip liner, lip. oral commisures, vermillion lips.
Additional Area 1 Location: Lateral Cheeks
Map Statment: See Attach Map for Complete Details
Additional Area 5 Location: chin
Additional Area 2 Location: [facility], marionette lines
Show Inventory Tab: Show
Anesthesia Volume In Cc: 0.5
Additional Area 3 Location: cheeks, jawline
Additional Area 1 Location: cheek fine lines

## 2024-03-29 ENCOUNTER — APPOINTMENT (RX ONLY)
Dept: URBAN - METROPOLITAN AREA CLINIC 23 | Facility: CLINIC | Age: 44
Setting detail: DERMATOLOGY
End: 2024-03-29

## 2024-03-29 DIAGNOSIS — Z41.9 ENCOUNTER FOR PROCEDURE FOR PURPOSES OTHER THAN REMEDYING HEALTH STATE, UNSPECIFIED: ICD-10-CM

## 2024-03-29 PROCEDURE — ? PULSED-DYE LASER

## 2024-03-29 NOTE — PROCEDURE: PULSED-DYE LASER
Location (Required For Details To Render In Note But Body Touch Will Also Count For First Location): lower face
Pulse Duration: 10 ms
Delay Time (Ms): 20
Immediate Endpoint: erythema
Cryogen Time (Ms): 30
Spot Size: 10 mm
Fluence In J/Cm2 (Optional): 8:00
Spot Size: 7 mm
Post-Care Instructions: I reviewed with the patient in detail post-care instructions. Patient should stay away from the sun and wear sun protection until treated areas are fully healed.
Consent: Written consent obtained, risks reviewed including but not limited to crusting, scabbing, blistering, scarring, darker or lighter pigmentary change, incidental hair removal, bruising, and/or incomplete removal.
Treated Area: small area
Laser Type: Vbeam 595nm
Price (Use Numbers Only, No Special Characters Or $): 0
Spot Size Override: 7:00
Detail Level: Detailed
Cryogen Time (Ms): 10
Fluence In J/Cm2 (Optional): 6

## 2024-04-03 ENCOUNTER — APPOINTMENT (RX ONLY)
Dept: URBAN - METROPOLITAN AREA CLINIC 23 | Facility: CLINIC | Age: 44
Setting detail: DERMATOLOGY
End: 2024-04-03

## 2024-04-03 DIAGNOSIS — Z41.9 ENCOUNTER FOR PROCEDURE FOR PURPOSES OTHER THAN REMEDYING HEALTH STATE, UNSPECIFIED: ICD-10-CM

## 2024-04-03 PROCEDURE — ? IPL HAIR REMOVAL

## 2024-04-03 NOTE — PROCEDURE: IPL HAIR REMOVAL
# Of Treatments In Package: 0
Post-Care Instructions: I reviewed with the patient in detail post-care instructions. Patient should avoid sun for a minimum of 4 weeks before and after treatment.
Pulse Duration (In Ms): 20 Ms
Render Post-Care In The Note: No
Filter/Handpiece: 
Pre-Procedure: Prior to proceeding the treatment areas were cleaned and all present put on their eye protection.
Length Of Topical Anesthesia Application (Optional): 15 minutes
Price $ (Use Numbers Only, No Text Please.): 350
Post-Procedure Care: Immediate endpoint: perifollicular erythema and edema. Vaseline and ice applied. Post care reviewed with patient.
Consent: Written consent obtained, risks reviewed including but not limited to crusting, scabbing, blistering, scarring, darker or lighter pigmentary change, paradoxical hair regrowth, incomplete removal of hair and infection.
Detail Level: Detailed
Laser Type: Funmilayo Orta
Fluence: 13.5 j/cm2

## 2024-04-15 ENCOUNTER — APPOINTMENT (RX ONLY)
Dept: URBAN - METROPOLITAN AREA CLINIC 23 | Facility: CLINIC | Age: 44
Setting detail: DERMATOLOGY
End: 2024-04-15

## 2024-04-15 DIAGNOSIS — Z41.9 ENCOUNTER FOR PROCEDURE FOR PURPOSES OTHER THAN REMEDYING HEALTH STATE, UNSPECIFIED: ICD-10-CM

## 2024-04-15 PROCEDURE — ? DELUXE HYDRAFACIAL

## 2024-04-15 PROCEDURE — ? DERMAPLANE

## 2024-04-15 PROCEDURE — ? IPL HAIR REMOVAL

## 2024-04-15 ASSESSMENT — LOCATION DETAILED DESCRIPTION DERM: LOCATION DETAILED: LEFT INFERIOR CENTRAL MALAR CHEEK

## 2024-04-15 ASSESSMENT — LOCATION SIMPLE DESCRIPTION DERM: LOCATION SIMPLE: LEFT CHEEK

## 2024-04-15 ASSESSMENT — LOCATION ZONE DERM: LOCATION ZONE: FACE

## 2024-04-15 NOTE — PROCEDURE: DELUXE HYDRAFACIAL
Procedure: Peel
Location: face
Tip: Hydropeel Tip, Teal
Vacuum Pressure High Setting (Will Not Render If Set To 0): 0
Procedure: Extend and Protect
Solution: Activ-4
Solution Override
Procedure: Extraction
Solution: GlySal 7.5%
Tip: Hydropeel Tip, Clear
Consent: Written consent obtained, risks reviewed including but not limited to crusting, scabbing, blistering, scarring, darker or lighter pigmentary change, bruising, and/or incomplete response.
Post-Care Instructions: I reviewed with the patient in detail post-care instructions. Patient should stay away from the sun and wear sun protection until treated areas are fully healed.
Treatment Number: 1
Price (Use Numbers Only, No Special Characters Or $): 349
Solution: Beta-HD
Procedure: Boost
Tip: Hydropeel Tip, Blue
Indication: skin texture
Procedure: Fusion
Tip Override
Procedure: Exfoliation

## 2024-04-15 NOTE — PROCEDURE: DERMAPLANE
Post-Care Instructions: I reviewed with the patient in detail post-care instructions.
Pre-Procedure Text: The patient was placed in a recumbant position on the procedure table.
Detail Level: Zone
Treatment Area Prep: alcohol
Treatment Areas: face
Post-Procedure Instructions: Following the dermaplane procedure, Oxymist treatment was applied to the treatment areas. Moisturizer and SPF was applied.
Price (Use Numbers Only, No Special Characters Or $): 100
Blade: 10 blade scalpel

## 2024-04-15 NOTE — PROCEDURE: IPL HAIR REMOVAL
Fluence: 13.7 j/cm2
External Cooling Fan Speed: 0
Laser Type: Funmilayo Orta
Post-Care Instructions: I reviewed with the patient in detail post-care instructions. Patient should avoid sun for a minimum of 4 weeks before and after treatment.
Pulse Duration (In Ms): 20 ms
Render Post-Care In The Note: No
Detail Level: Detailed
Filter/Handpiece: 
Length Of Topical Anesthesia Application (Optional): 15 minutes
Pre-Procedure: Prior to proceeding the treatment areas were cleaned and all present put on their eye protection.
Post-Procedure Care: Immediate endpoint: perifollicular erythema and edema. Vaseline and ice applied. Post care reviewed with patient.
Consent: Written consent obtained, risks reviewed including but not limited to crusting, scabbing, blistering, scarring, darker or lighter pigmentary change, paradoxical hair regrowth, incomplete removal of hair and infection.
Price $ (Use Numbers Only, No Text Please.): 500

## 2024-05-16 ENCOUNTER — APPOINTMENT (RX ONLY)
Dept: URBAN - METROPOLITAN AREA CLINIC 23 | Facility: CLINIC | Age: 44
Setting detail: DERMATOLOGY
End: 2024-05-16

## 2024-05-16 DIAGNOSIS — Z41.9 ENCOUNTER FOR PROCEDURE FOR PURPOSES OTHER THAN REMEDYING HEALTH STATE, UNSPECIFIED: ICD-10-CM

## 2024-05-16 PROCEDURE — ? IPL HAIR REMOVAL

## 2024-05-16 NOTE — PROCEDURE: IPL HAIR REMOVAL
Render Post-Care In The Note: No
Treatment Number: 0
Length Of Topical Anesthesia Application (Optional): 15 minutes
Filter/Handpiece: 
Pre-Procedure: Prior to proceeding the treatment areas were cleaned and all present put on their eye protection.
Post-Procedure Care: Immediate endpoint: perifollicular erythema and edema. Vaseline and ice applied. Post care reviewed with patient.
Topical Anesthesia Type: BLT cream (benzocaine 20%, lidocaine 10%, tetracaine 10%)
Price $ (Use Numbers Only, No Text Please.): 350
Consent: Written consent obtained, risks reviewed including but not limited to crusting, scabbing, blistering, scarring, darker or lighter pigmentary change, paradoxical hair regrowth, incomplete removal of hair and infection.
Laser Type: Funmilayo Orta
Post-Care Instructions: I reviewed with the patient in detail post-care instructions. Patient should avoid sun for a minimum of 4 weeks before and after treatment.
Fluence: 13.7 j/cm2
Pulse Duration (In Ms): 20 Ms
Detail Level: Detailed

## 2024-05-28 ENCOUNTER — APPOINTMENT (RX ONLY)
Dept: URBAN - METROPOLITAN AREA CLINIC 23 | Facility: CLINIC | Age: 44
Setting detail: DERMATOLOGY
End: 2024-05-28

## 2024-05-28 DIAGNOSIS — Z41.9 ENCOUNTER FOR PROCEDURE FOR PURPOSES OTHER THAN REMEDYING HEALTH STATE, UNSPECIFIED: ICD-10-CM

## 2024-05-28 PROCEDURE — ? INVENTORY

## 2024-05-28 PROCEDURE — ? COSMETIC CONSULTATION: HAIR REMOVAL

## 2024-05-28 PROCEDURE — ? RECOMMENDATIONS

## 2024-05-28 PROCEDURE — ? FILLERS

## 2024-05-28 PROCEDURE — ? BOTOX

## 2024-05-28 NOTE — PROCEDURE: BOTOX
Levator Labii Superioris Units: 0
Incrementing Botox Units: By 0.5 Units
Show Levator Superior Units: Yes
Show Lcl Units: No
Additional Area 1 Location: upper lip
Additional Area 5 Location: axillas
Show Inventory Tab: Show
Additional Area 4 Location: Glabellar Complex - Touch up
Forehead Units: 14
Expiration Date (Month Year): 2024/05
Glabellar Complex Units: 10
Consent: Written consent obtained. Risks include but not limited to lid/brow ptosis, bruising, swelling, diplopia, temporary effect, incomplete chemical denervation.
Lot #: [id number]
Additional Area 3 Location: Trapezius
Post-Care Instructions: Patient instructed to not lie down for 4 hours and limit physical activity for 24 hours. Patient instructed not to travel by airplane for 48 hours.
Additional Area 2 Location: forehead NC
Additional Area 6 Location: harry felipe
Dilution (U/0.1 Cc): 2.5
Detail Level: Detailed

## 2024-05-28 NOTE — PROCEDURE: RECOMMENDATIONS
Detail Level: Detailed
Recommendations (Free Text): [employer] $600 per syringe on the neck
Render Risk Assessment In Note?: no

## 2024-05-28 NOTE — PROCEDURE: FILLERS
Additional Area 4 Volume In Cc: 0
Include Cannula Information In Note?: No
Additional Area 3 Location: marionette lines, lateral mouth,oral commesure
Include Cannula Length?: 1.5 inch
Include Cannula Brand?: DermaSculpt
Detail Level: Zone
Additional Area 1 Location: upper lip lines
Additional Area 4 Location: chin, lateral cheeks, NLF?s, marionette lines
Additional Area 1 Location: Chin
Lot #: [id number]
Include Cannula Size?: 25G
Additional Area 5 Location: [facility], marionette lines
Additional Area 2 Location: Lateral cheeks, Oral Commisures
Expiration Date (Month Year): 2024-07-31
Additional Area 2 Location: left dorsal hand
Consent: Written consent obtained. Risks include but not limited to bruising, beading, irregular texture, ulceration, infection, allergic reaction, scar formation, incomplete augmentation, temporary nature, procedural pain.
Additional Area 2 Volume In Cc: 0.5
Filler: Restylane Eyelight
Lot #: [id number]
Expiration Date (Month Year): 2024-03
Post-Care Instructions: Patient instructed to apply ice to reduce swelling.
Include Cannula Length?: 1 inch
Map Statment: See Attach Map for Complete Details
Additional Area 1 Location: chin lines, marionette lines (physician sample)
Additional Area 4 Location: cheeks
Additional Area 5 Location: oral commissures, upper lip lines, vermillion lips
Additional Area 5 Location: ear lobe
Anesthesia Type: 1% lidocaine without epinephrine
Aspiration Statement: Aspiration was performed prior to injecting site with filler.
Lot #: [id number]
Anesthesia Volume In Cc: 0.2
Expiration Date (Month Year): 03/14/25

## 2024-06-18 ENCOUNTER — APPOINTMENT (OUTPATIENT)
Dept: OPHTHALMOLOGY | Facility: CLINIC | Age: 44
End: 2024-06-18
Payer: COMMERCIAL

## 2024-06-18 ENCOUNTER — NON-APPOINTMENT (OUTPATIENT)
Age: 44
End: 2024-06-18

## 2024-06-18 PROCEDURE — 92014 COMPRE OPH EXAM EST PT 1/>: CPT

## 2024-08-28 ENCOUNTER — APPOINTMENT (RX ONLY)
Dept: URBAN - METROPOLITAN AREA CLINIC 23 | Facility: CLINIC | Age: 44
Setting detail: DERMATOLOGY
End: 2024-08-28

## 2024-08-28 DIAGNOSIS — Z41.9 ENCOUNTER FOR PROCEDURE FOR PURPOSES OTHER THAN REMEDYING HEALTH STATE, UNSPECIFIED: ICD-10-CM

## 2024-08-28 PROCEDURE — ? IPL HAIR REMOVAL

## 2024-08-28 NOTE — PROCEDURE: IPL HAIR REMOVAL
Consent: Written consent obtained, risks reviewed including but not limited to crusting, scabbing, blistering, scarring, darker or lighter pigmentary change, paradoxical hair regrowth, incomplete removal of hair and infection.
Treatment Number: 0
Price $ (Use Numbers Only, No Text Please.): 350
Detail Level: Detailed
Pre-Procedure: Prior to proceeding the treatment areas were cleaned and all present put on their eye protection.
Topical Anesthesia Type: BLT cream (benzocaine 20%, lidocaine 10%, tetracaine 10%)
Filter/Handpiece: 
Post-Care Instructions: I reviewed with the patient in detail post-care instructions. Patient should avoid sun for a minimum of 4 weeks before and after treatment.
Render Post-Care In The Note: No
Post-Procedure Care: Immediate endpoint: perifollicular erythema and edema. Vaseline and ice applied. Post care reviewed with patient.
Fluence: 14.1 j/cm2
Length Of Topical Anesthesia Application (Optional): 15 minutes
Laser Type: Funmilayo Orta
Pulse Duration (In Ms): 20 Ms

## 2024-08-29 ENCOUNTER — APPOINTMENT (RX ONLY)
Dept: URBAN - METROPOLITAN AREA CLINIC 23 | Facility: CLINIC | Age: 44
Setting detail: DERMATOLOGY
End: 2024-08-29

## 2024-08-29 DIAGNOSIS — Z41.9 ENCOUNTER FOR PROCEDURE FOR PURPOSES OTHER THAN REMEDYING HEALTH STATE, UNSPECIFIED: ICD-10-CM

## 2024-08-29 PROCEDURE — ? INVENTORY

## 2024-08-29 PROCEDURE — ? IPL HAIR REMOVAL

## 2024-08-29 PROCEDURE — ? PRX-T33 BIOREVITALIZATION TREATMENT

## 2024-08-29 NOTE — PROCEDURE: IPL HAIR REMOVAL
Post-Procedure Care: Immediate endpoint: perifollicular erythema and edema. Vaseline and ice applied. Post care reviewed with patient.
# Of Treatments In Package: 0
Topical Anesthesia Type: BLT cream (benzocaine 20%, lidocaine 10%, tetracaine 10%)
Detail Level: Detailed
Fluence: 13.9 j/cm2
Laser Type: Funmilayo Orta
Consent: Written consent obtained, risks reviewed including but not limited to crusting, scabbing, blistering, scarring, darker or lighter pigmentary change, paradoxical hair regrowth, incomplete removal of hair and infection.
Pulse Duration (In Ms): 20 Ms
Post-Care Instructions: I reviewed with the patient in detail post-care instructions. Patient should avoid sun for a minimum of 4 weeks before and after treatment.
Length Of Topical Anesthesia Application (Optional): 15 minutes
Filter/Handpiece: 
Price $ (Use Numbers Only, No Text Please.): 200
Pre-Procedure: Prior to proceeding the treatment areas were cleaned and all present put on their eye protection.
Render Post-Care In The Note: No

## 2024-08-29 NOTE — PROCEDURE: PRX-T33 BIOREVITALIZATION TREATMENT
Post Peel Care: After the procedure, the treatment areas were washed and moisturizing cream was applied. Sun protection and post-care instructions were reviewed with the patient.
Post-Care Instructions: I reviewed with the patient in detail post-care instructions. Patient should avoid sun exposure and wear sun protection.
Treatment Number: 0
Expiration Date (Optional): 04/2025
Prep: Prior to starting treatment the patient was asked to wash the treatment areas with facial cleanser. The treatment areas were degreased with PRX-T prep solution.
Price (Use Numbers Only, No Special Characters Or $): 400
Lot Number (Optional): [id number]
Detail Level: Zone
Consent: Prior to the procedure, consent was obtained and risks were reviewed, including but not limited to: redness, late exfoliation and dark spots which all can take up to a few days to resolve.
Number Of Layers: 3
Chemical Peel: PRX-T33

## 2024-09-09 ENCOUNTER — APPOINTMENT (RX ONLY)
Dept: URBAN - METROPOLITAN AREA CLINIC 23 | Facility: CLINIC | Age: 44
Setting detail: DERMATOLOGY
End: 2024-09-09

## 2024-09-09 DIAGNOSIS — Z41.9 ENCOUNTER FOR PROCEDURE FOR PURPOSES OTHER THAN REMEDYING HEALTH STATE, UNSPECIFIED: ICD-10-CM

## 2024-09-09 PROCEDURE — ? SOFWAVE

## 2024-09-09 NOTE — PROCEDURE: SOFWAVE
Detail Level: Detailed
Post Cooling (In Sec): 1.0
# Of Treatments In Package: 0
Render Post-Care In The Note: No
Consent: Written consent obtained, risks reviewed including but not limited to crusting, scabbing, blistering, scarring, darker or lighter pigmentary change, need for addtional treatments, and incomplete result.
Device: [employer]
Total Pulses: 210
Location: cheeks and jowls
Topical % #1: 23
Topical Anesthetic #2: tetracaine
Pre-Procedure: Prior to proceeding the treatment areas were cleaned and gel was applied.
Topical Anesthetic #1: lidocaine
Post-Care Instructions: I reviewed with the patient in detail post-care instructions.
Post-Procedure Care: Normal mild skin redness was seen.
Energy (Joules): 3.6
Topical Anesthesia?: yes
Topical Anesthetic Base: ointment
Treatment Number: 1
Topical % #2: 7
Pulse Duration (In Sec): 5.0
Price $ (Use Numbers Only, No Text Please.): 0786

## 2024-10-07 ENCOUNTER — APPOINTMENT (RX ONLY)
Dept: URBAN - METROPOLITAN AREA CLINIC 23 | Facility: CLINIC | Age: 44
Setting detail: DERMATOLOGY
End: 2024-10-07

## 2024-10-07 DIAGNOSIS — Z41.9 ENCOUNTER FOR PROCEDURE FOR PURPOSES OTHER THAN REMEDYING HEALTH STATE, UNSPECIFIED: ICD-10-CM

## 2024-10-07 PROCEDURE — ? IPL HAIR REMOVAL

## 2024-10-07 NOTE — PROCEDURE: IPL HAIR REMOVAL
Render Post-Care In The Note: No
# Of Treatments In Package: 0
Laser Type: Funmilayo Orta
Pre-Procedure: Prior to proceeding the treatment areas were cleaned and all present put on their eye protection.
Fluence: 14.1 j/cm2
Consent: Written consent obtained, risks reviewed including but not limited to crusting, scabbing, blistering, scarring, darker or lighter pigmentary change, paradoxical hair regrowth, incomplete removal of hair and infection.
Price $ (Use Numbers Only, No Text Please.): 500
Length Of Topical Anesthesia Application (Optional): 15 minutes
Post-Procedure Care: Immediate endpoint: perifollicular erythema and edema. Vaseline and ice applied. Post care reviewed with patient.
Topical Anesthesia Type: 20% benzocaine, 10% lidocaine, 10% tetracaine
Post-Care Instructions: I reviewed with the patient in detail post-care instructions. Patient should avoid sun for a minimum of 4 weeks before and after treatment.
Filter/Handpiece: 
Detail Level: Detailed
Pulse Duration (In Ms): 20 Ms

## 2024-11-06 ENCOUNTER — APPOINTMENT (RX ONLY)
Dept: URBAN - METROPOLITAN AREA CLINIC 23 | Facility: CLINIC | Age: 44
Setting detail: DERMATOLOGY
End: 2024-11-06

## 2024-11-06 DIAGNOSIS — Z41.9 ENCOUNTER FOR PROCEDURE FOR PURPOSES OTHER THAN REMEDYING HEALTH STATE, UNSPECIFIED: ICD-10-CM

## 2024-11-06 PROCEDURE — ? INVENTORY

## 2024-11-06 PROCEDURE — ? MICRONEEDLING

## 2024-11-06 NOTE — PROCEDURE: MICRONEEDLING
Depth In Mm (Location #7): 0.1
Price (Use Numbers Only, No Special Characters Or $): 347
Treatment Number (Optional): 0
Detail Level: Zone
Topical Anesthesia?: BLT ointment (benzocaine 20%, lidocaine 12%, tetracaine 6%)
Consent: Written consent obtained, risks reviewed including but not limited to pain, scarring, infection and incomplete improvement.  Patient understands the procedure is cosmetic in nature and will require out of pocket payment.
Depth In Mm (Location #1): 1.5
Depth In Mm (Location #2): 1
Location #1: face
Infusions (Optional): PRP

## 2024-11-22 ENCOUNTER — APPOINTMENT (RX ONLY)
Dept: URBAN - METROPOLITAN AREA CLINIC 23 | Facility: CLINIC | Age: 44
Setting detail: DERMATOLOGY
End: 2024-11-22

## 2024-11-22 DIAGNOSIS — Z41.9 ENCOUNTER FOR PROCEDURE FOR PURPOSES OTHER THAN REMEDYING HEALTH STATE, UNSPECIFIED: ICD-10-CM

## 2024-11-22 PROCEDURE — ? BOTOX

## 2024-11-22 NOTE — PROCEDURE: BOTOX
Show Levator Superior Units: Yes
Incrementing Botox Units: By 0.5 Units
Levator Labii Superioris Units: 0
Dilution (U/0.1 Cc): 1.2
Additional Area 2 Location: Lip flip
Additional Area 5 Location: axillas
Additional Area 4 Location: columella
Additional Area 1 Location: Lateral Brows
Additional Area 2 Units: 4
Show Inventory Tab: Show
Show Ucl Units: No
Consent: Written consent obtained. Risks include but not limited to lid/brow ptosis, bruising, swelling, diplopia, temporary effect, incomplete chemical denervation.
Lot #: [id number]
Forehead Units: 8
Expiration Date (Month Year): 2026/09
Additional Area 6 Location: chin
Post-Care Instructions: Patient instructed to not lie down for 4 hours and limit physical activity for 24 hours. Patient instructed not to travel by airplane for 48 hours.
Detail Level: Detailed

## 2024-12-12 ENCOUNTER — APPOINTMENT (OUTPATIENT)
Dept: URBAN - METROPOLITAN AREA CLINIC 23 | Facility: CLINIC | Age: 44
Setting detail: DERMATOLOGY
End: 2024-12-12

## 2024-12-12 DIAGNOSIS — Z41.9 ENCOUNTER FOR PROCEDURE FOR PURPOSES OTHER THAN REMEDYING HEALTH STATE, UNSPECIFIED: ICD-10-CM

## 2024-12-12 PROCEDURE — ? MICRONEEDLING

## 2024-12-12 PROCEDURE — ? INVENTORY

## 2024-12-12 NOTE — PROCEDURE: MICRONEEDLING
Location #1: face
Infusions (Optional): PRP
Consent: Written consent obtained, risks reviewed including but not limited to pain, scarring, infection and incomplete improvement.  Patient understands the procedure is cosmetic in nature and will require out of pocket payment.
Detail Level: Zone
Depth In Mm (Location #2): 1
How Many Cc Of Bacteriostatic 0.9% Saline Were Added?: 0
Depth In Mm (Location #6): 0.1
Depth In Mm (Location #1): 1.5
Price (Use Numbers Only, No Special Characters Or $): 500
Topical Anesthesia?: BLT cream (benzocaine 30%, lidocaine 12%, tetracaine 6%)

## 2025-03-18 ENCOUNTER — APPOINTMENT (OUTPATIENT)
Dept: URBAN - METROPOLITAN AREA CLINIC 23 | Facility: CLINIC | Age: 45
Setting detail: DERMATOLOGY
End: 2025-03-18

## 2025-03-18 DIAGNOSIS — Z41.9 ENCOUNTER FOR PROCEDURE FOR PURPOSES OTHER THAN REMEDYING HEALTH STATE, UNSPECIFIED: ICD-10-CM

## 2025-03-18 PROCEDURE — ? BOTOX

## 2025-03-18 PROCEDURE — ? RECOMMENDATIONS

## 2025-03-18 PROCEDURE — ? FILLERS

## 2025-03-18 NOTE — PROCEDURE: RECOMMENDATIONS
Detail Level: Zone
Render Risk Assessment In Note?: no
Recommendations (Free Text): Ematrix neck $500\\nRadiesse $1,000

## 2025-03-18 NOTE — PROCEDURE: BOTOX
Show Forehead Units: Yes
Inferior Lateral Orbicularis Oculi Units: 0
Detail Level: Detailed
Additional Area 3 Location: neck
Consent: Written consent obtained. Risks include but not limited to lid/brow ptosis, bruising, swelling, diplopia, temporary effect, incomplete chemical denervation.
Show Right And Left Pupillary Line Units: No
Additional Area 2 Location: claude
Lot #: [id number]
Periorbital Skin Units: 8
Additional Area 3 Units: 12
Post-Care Instructions: Patient instructed to not lie down for 4 hours and limit physical activity for 24 hours. Patient instructed not to travel by airplane for 48 hours.
Expiration Date (Month Year): 2026/11
Show Inventory Tab: Show
Incrementing Botox Units: By 0.5 Units
Additional Area 6 Location: neck bands
Additional Area 1 Location: Chin
Dilution (U/0.1 Cc): 1.2
Additional Area 5 Location: lateral brows
Glabellar Complex Units: 10

## 2025-03-18 NOTE — PROCEDURE: FILLERS
Brows Filler Volume In Cc: 0
Additional Area 3 Location: cheeks, jawline
Additional Area 3 Location: jawline, nlfs, marionette lines, chin
Number Of Syringes (Required For Inventory): 1
Include Cannula Information In Note?: No
Include Cannula Size?: 25G
Additional Area 1 Location: cheeks, vermilion lips
Additional Area 4 Location: jawline, cheeks
Include Cannula Length?: 1.5 inch
Include Cannula Brand?: DermaSculpt
Filler: Restylane-L
Additional Area 2 Location: vermillion lips, oral commissure
Additional Area 5 Location: ear lobes
Additional Area 3 Location: neck, jawline, temples
Map Statment: See Attach Map for Complete Details
Inventory Information: This plan will send filler information to inventory based on the fillers you select. Multiple fillers can be sent but you must ensure you select the appropriate fillers in the inventory tab.
Detail Level: Detailed
Additional Area 4 Location: cheeks, marionette lines, vermillion lips
Additional Area 1 Location: lateral jaw
Additional Area 5 Location: marionette lines, nlfs, chin
Show Inventory Tab: Show
Consent: Written consent obtained. Risks include but not limited to bruising, beading, irregular texture, ulceration, infection, allergic reaction, scar formation, incomplete augmentation, temporary nature, procedural pain.
Additional Area 1 Location: cheek fine lines
Anesthesia Volume In Cc: 0.5
Additional Area 2 Location: cesar oral fine lines
Include Cannula Information In Note?: Yes
Post-Care Instructions: Patient instructed to apply ice to reduce swelling.
Additional Area 1 Location: marionette lines
Additional Area 2 Location: cesar oral
Additional Area 2 Location: marionette lines, upper and lower lip, and jawline

## 2025-06-17 ENCOUNTER — NON-APPOINTMENT (OUTPATIENT)
Age: 45
End: 2025-06-17

## 2025-06-17 ENCOUNTER — APPOINTMENT (OUTPATIENT)
Dept: OPHTHALMOLOGY | Facility: CLINIC | Age: 45
End: 2025-06-17
Payer: COMMERCIAL

## 2025-06-17 PROCEDURE — 92014 COMPRE OPH EXAM EST PT 1/>: CPT

## 2025-06-17 PROCEDURE — 92133 CPTRZD OPH DX IMG PST SGM ON: CPT
